# Patient Record
Sex: MALE | Race: OTHER | NOT HISPANIC OR LATINO | Employment: OTHER | URBAN - METROPOLITAN AREA
[De-identification: names, ages, dates, MRNs, and addresses within clinical notes are randomized per-mention and may not be internally consistent; named-entity substitution may affect disease eponyms.]

---

## 2024-04-15 ENCOUNTER — HOSPITAL ENCOUNTER (INPATIENT)
Facility: HOSPITAL | Age: 44
LOS: 1 days | Discharge: HOME | DRG: 065 | End: 2024-04-16
Attending: EMERGENCY MEDICINE | Admitting: STUDENT IN AN ORGANIZED HEALTH CARE EDUCATION/TRAINING PROGRAM

## 2024-04-15 DIAGNOSIS — I63.81 LACUNAR INFARCTION: ICD-10-CM

## 2024-04-15 DIAGNOSIS — I63.81 ACUTE LACUNAR INFARCTION: Primary | ICD-10-CM

## 2024-04-15 DIAGNOSIS — I63.9 STROKE: ICD-10-CM

## 2024-04-15 DIAGNOSIS — R29.818 ACUTE FOCAL NEUROLOGICAL DEFICIT: ICD-10-CM

## 2024-04-15 PROBLEM — Q21.12 PFO (PATENT FORAMEN OVALE): Status: ACTIVE | Noted: 2024-04-15

## 2024-04-15 PROBLEM — I63.332 THROMBOTIC STROKE INVOLVING LEFT POSTERIOR CEREBRAL ARTERY: Status: ACTIVE | Noted: 2024-04-15

## 2024-04-15 LAB
ALBUMIN SERPL BCP-MCNC: 3.9 G/DL (ref 3.5–5.2)
ALP SERPL-CCNC: 76 U/L (ref 55–135)
ALT SERPL W/O P-5'-P-CCNC: 22 U/L (ref 10–44)
AMPHET+METHAMPHET UR QL: NEGATIVE
ANION GAP SERPL CALC-SCNC: 11 MMOL/L (ref 8–16)
ASCENDING AORTA: 2.84 CM
AST SERPL-CCNC: 17 U/L (ref 10–40)
AV INDEX (PROSTH): 0.83
AV MEAN GRADIENT: 4 MMHG
AV PEAK GRADIENT: 8 MMHG
AV VALVE AREA BY VELOCITY RATIO: 2.67 CM²
AV VALVE AREA: 2.63 CM²
AV VELOCITY RATIO: 0.84
BARBITURATES UR QL SCN>200 NG/ML: NEGATIVE
BASOPHILS # BLD AUTO: 0.03 K/UL (ref 0–0.2)
BASOPHILS NFR BLD: 0.4 % (ref 0–1.9)
BENZODIAZ UR QL SCN>200 NG/ML: NEGATIVE
BILIRUB SERPL-MCNC: 1.8 MG/DL (ref 0.1–1)
BUN SERPL-MCNC: 14 MG/DL (ref 6–20)
BZE UR QL SCN: NEGATIVE
CALCIUM SERPL-MCNC: 9.4 MG/DL (ref 8.7–10.5)
CANNABINOIDS UR QL SCN: NEGATIVE
CHLORIDE SERPL-SCNC: 108 MMOL/L (ref 95–110)
CHOLEST SERPL-MCNC: 153 MG/DL (ref 120–199)
CHOLEST/HDLC SERPL: 2.9 {RATIO} (ref 2–5)
CO2 SERPL-SCNC: 22 MMOL/L (ref 23–29)
CREAT SERPL-MCNC: 1.1 MG/DL (ref 0.5–1.4)
CREAT UR-MCNC: 196.8 MG/DL (ref 23–375)
CRP SERPL-MCNC: 4.8 MG/L (ref 0–8.2)
CV ECHO LV RWT: 0.35 CM
DIFFERENTIAL METHOD BLD: ABNORMAL
DOP CALC AO PEAK VEL: 1.4 M/S
DOP CALC AO VTI: 32.2 CM
DOP CALC LVOT AREA: 3.2 CM2
DOP CALC LVOT DIAMETER: 2.01 CM
DOP CALC LVOT PEAK VEL: 1.18 M/S
DOP CALC LVOT STROKE VOLUME: 84.68 CM3
DOP CALC MV VTI: 27.6 CM
DOP CALCLVOT PEAK VEL VTI: 26.7 CM
E WAVE DECELERATION TIME: 232.42 MSEC
E/A RATIO: 1.51
E/E' RATIO: 5.92 M/S
ECHO LV POSTERIOR WALL: 0.98 CM (ref 0.6–1.1)
EOSINOPHIL # BLD AUTO: 0.1 K/UL (ref 0–0.5)
EOSINOPHIL NFR BLD: 1.2 % (ref 0–8)
ERYTHROCYTE [DISTWIDTH] IN BLOOD BY AUTOMATED COUNT: 17.8 % (ref 11.5–14.5)
ERYTHROCYTE [SEDIMENTATION RATE] IN BLOOD BY WESTERGREN METHOD: 1 MM/HR (ref 0–10)
EST. GFR  (NO RACE VARIABLE): >60 ML/MIN/1.73 M^2
ETHANOL SERPL-MCNC: <10 MG/DL
FERRITIN SERPL-MCNC: 90 NG/ML (ref 20–300)
FRACTIONAL SHORTENING: 34 % (ref 28–44)
GLUCOSE SERPL-MCNC: 104 MG/DL (ref 70–110)
HCT VFR BLD AUTO: 46 % (ref 40–54)
HDLC SERPL-MCNC: 53 MG/DL (ref 40–75)
HDLC SERPL: 34.6 % (ref 20–50)
HGB BLD-MCNC: 14.3 G/DL (ref 14–18)
IMM GRANULOCYTES # BLD AUTO: 0.01 K/UL (ref 0–0.04)
IMM GRANULOCYTES NFR BLD AUTO: 0.1 % (ref 0–0.5)
INTERVENTRICULAR SEPTUM: 1 CM (ref 0.6–1.1)
IRON SERPL-MCNC: 107 UG/DL (ref 45–160)
IVC DIAMETER: 1.25 CM
IVRT: 89.44 MSEC
LA MAJOR: 5.56 CM
LA MINOR: 5.61 CM
LA WIDTH: 3.9 CM
LDLC SERPL CALC-MCNC: 87.6 MG/DL (ref 63–159)
LEFT ATRIUM SIZE: 4.03 CM
LEFT ATRIUM VOLUME: 74.61 CM3
LEFT INTERNAL DIMENSION IN SYSTOLE: 3.68 CM (ref 2.1–4)
LEFT VENTRICLE DIASTOLIC VOLUME: 150.82 ML
LEFT VENTRICLE SYSTOLIC VOLUME: 57.54 ML
LEFT VENTRICULAR INTERNAL DIMENSION IN DIASTOLE: 5.55 CM (ref 3.5–6)
LEFT VENTRICULAR MASS: 213.6 G
LV LATERAL E/E' RATIO: 4.53 M/S
LV SEPTAL E/E' RATIO: 8.56 M/S
LVOT MG: 2.8 MMHG
LVOT MV: 0.77 CM/S
LYMPHOCYTES # BLD AUTO: 2.3 K/UL (ref 1–4.8)
LYMPHOCYTES NFR BLD: 31.4 % (ref 18–48)
MCH RBC QN AUTO: 21.8 PG (ref 27–31)
MCHC RBC AUTO-ENTMCNC: 31.1 G/DL (ref 32–36)
MCV RBC AUTO: 70 FL (ref 82–98)
METHADONE UR QL SCN>300 NG/ML: NEGATIVE
MONOCYTES # BLD AUTO: 0.4 K/UL (ref 0.3–1)
MONOCYTES NFR BLD: 5.8 % (ref 4–15)
MV MEAN GRADIENT: 1 MMHG
MV PEAK A VEL: 0.51 M/S
MV PEAK E VEL: 0.77 M/S
MV PEAK GRADIENT: 3 MMHG
MV STENOSIS PRESSURE HALF TIME: 67.4 MS
MV VALVE AREA BY CONTINUITY EQUATION: 3.07 CM2
MV VALVE AREA P 1/2 METHOD: 3.26 CM2
NEUTROPHILS # BLD AUTO: 4.5 K/UL (ref 1.8–7.7)
NEUTROPHILS NFR BLD: 61.1 % (ref 38–73)
NONHDLC SERPL-MCNC: 100 MG/DL
NRBC BLD-RTO: 0 /100 WBC
OHS QRS DURATION: 88 MS
OHS QTC CALCULATION: 423 MS
OPIATES UR QL SCN: NEGATIVE
PCP UR QL SCN>25 NG/ML: NEGATIVE
PISA TR MAX VEL: 2.13 M/S
PLATELET # BLD AUTO: 212 K/UL (ref 150–450)
PMV BLD AUTO: 9.6 FL (ref 9.2–12.9)
POCT GLUCOSE: 100 MG/DL (ref 70–110)
POTASSIUM SERPL-SCNC: 4.1 MMOL/L (ref 3.5–5.1)
PROT SERPL-MCNC: 7.7 G/DL (ref 6–8.4)
PULM VEIN S/D RATIO: 1.03
PV PEAK D VEL: 0.59 M/S
PV PEAK GRADIENT: 6 MMHG
PV PEAK S VEL: 0.61 M/S
PV PEAK VELOCITY: 1.2 M/S
RA MAJOR: 5.17 CM
RA PRESSURE ESTIMATED: 3 MMHG
RA WIDTH: 4 CM
RBC # BLD AUTO: 6.55 M/UL (ref 4.6–6.2)
RIGHT VENTRICULAR END-DIASTOLIC DIMENSION: 3.73 CM
RV TB RVSP: 5 MMHG
RV TISSUE DOPPLER FREE WALL SYSTOLIC VELOCITY 1 (APICAL 4 CHAMBER VIEW): 14.57 CM/S
SATURATED IRON: 29 % (ref 20–50)
SINUS: 3.3 CM
SODIUM SERPL-SCNC: 141 MMOL/L (ref 136–145)
STJ: 2.42 CM
TDI LATERAL: 0.17 M/S
TDI SEPTAL: 0.09 M/S
TDI: 0.13 M/S
TOTAL IRON BINDING CAPACITY: 363 UG/DL (ref 250–450)
TOXICOLOGY INFORMATION: NORMAL
TR MAX PG: 18 MMHG
TRANSFERRIN SERPL-MCNC: 245 MG/DL (ref 200–375)
TRIGL SERPL-MCNC: 62 MG/DL (ref 30–150)
TSH SERPL DL<=0.005 MIU/L-ACNC: 0.46 UIU/ML (ref 0.4–4)
TV REST PULMONARY ARTERY PRESSURE: 21 MMHG
WBC # BLD AUTO: 7.42 K/UL (ref 3.9–12.7)

## 2024-04-15 PROCEDURE — 85025 COMPLETE CBC W/AUTO DIFF WBC: CPT | Performed by: EMERGENCY MEDICINE

## 2024-04-15 PROCEDURE — 80307 DRUG TEST PRSMV CHEM ANLYZR: CPT | Performed by: EMERGENCY MEDICINE

## 2024-04-15 PROCEDURE — 99223 1ST HOSP IP/OBS HIGH 75: CPT | Mod: ,,, | Performed by: STUDENT IN AN ORGANIZED HEALTH CARE EDUCATION/TRAINING PROGRAM

## 2024-04-15 PROCEDURE — 93010 ELECTROCARDIOGRAM REPORT: CPT | Mod: ,,, | Performed by: INTERNAL MEDICINE

## 2024-04-15 PROCEDURE — 80061 LIPID PANEL: CPT | Performed by: EMERGENCY MEDICINE

## 2024-04-15 PROCEDURE — 85652 RBC SED RATE AUTOMATED: CPT | Performed by: STUDENT IN AN ORGANIZED HEALTH CARE EDUCATION/TRAINING PROGRAM

## 2024-04-15 PROCEDURE — 86235 NUCLEAR ANTIGEN ANTIBODY: CPT | Mod: 59 | Performed by: STUDENT IN AN ORGANIZED HEALTH CARE EDUCATION/TRAINING PROGRAM

## 2024-04-15 PROCEDURE — 80053 COMPREHEN METABOLIC PANEL: CPT | Performed by: EMERGENCY MEDICINE

## 2024-04-15 PROCEDURE — 82962 GLUCOSE BLOOD TEST: CPT

## 2024-04-15 PROCEDURE — 25000003 PHARM REV CODE 250: Performed by: STUDENT IN AN ORGANIZED HEALTH CARE EDUCATION/TRAINING PROGRAM

## 2024-04-15 PROCEDURE — 21400001 HC TELEMETRY ROOM

## 2024-04-15 PROCEDURE — 25000003 PHARM REV CODE 250: Performed by: EMERGENCY MEDICINE

## 2024-04-15 PROCEDURE — 84443 ASSAY THYROID STIM HORMONE: CPT | Performed by: EMERGENCY MEDICINE

## 2024-04-15 PROCEDURE — 82728 ASSAY OF FERRITIN: CPT | Performed by: STUDENT IN AN ORGANIZED HEALTH CARE EDUCATION/TRAINING PROGRAM

## 2024-04-15 PROCEDURE — 86140 C-REACTIVE PROTEIN: CPT | Performed by: STUDENT IN AN ORGANIZED HEALTH CARE EDUCATION/TRAINING PROGRAM

## 2024-04-15 PROCEDURE — 82077 ASSAY SPEC XCP UR&BREATH IA: CPT | Performed by: EMERGENCY MEDICINE

## 2024-04-15 PROCEDURE — 86038 ANTINUCLEAR ANTIBODIES: CPT | Performed by: STUDENT IN AN ORGANIZED HEALTH CARE EDUCATION/TRAINING PROGRAM

## 2024-04-15 PROCEDURE — 83540 ASSAY OF IRON: CPT | Performed by: STUDENT IN AN ORGANIZED HEALTH CARE EDUCATION/TRAINING PROGRAM

## 2024-04-15 PROCEDURE — 25500020 PHARM REV CODE 255: Performed by: EMERGENCY MEDICINE

## 2024-04-15 PROCEDURE — 86039 ANTINUCLEAR ANTIBODIES (ANA): CPT | Performed by: STUDENT IN AN ORGANIZED HEALTH CARE EDUCATION/TRAINING PROGRAM

## 2024-04-15 PROCEDURE — 83036 HEMOGLOBIN GLYCOSYLATED A1C: CPT | Performed by: EMERGENCY MEDICINE

## 2024-04-15 PROCEDURE — 93005 ELECTROCARDIOGRAM TRACING: CPT

## 2024-04-15 PROCEDURE — 99285 EMERGENCY DEPT VISIT HI MDM: CPT | Mod: 25

## 2024-04-15 RX ORDER — CLOPIDOGREL BISULFATE 75 MG/1
75 TABLET ORAL DAILY
Status: DISCONTINUED | OUTPATIENT
Start: 2024-04-16 | End: 2024-04-16 | Stop reason: HOSPADM

## 2024-04-15 RX ORDER — CLOPIDOGREL BISULFATE 300 MG/1
300 TABLET, FILM COATED ORAL
Status: COMPLETED | OUTPATIENT
Start: 2024-04-15 | End: 2024-04-15

## 2024-04-15 RX ORDER — ASPIRIN 325 MG
325 TABLET, DELAYED RELEASE (ENTERIC COATED) ORAL
Status: COMPLETED | OUTPATIENT
Start: 2024-04-15 | End: 2024-04-15

## 2024-04-15 RX ORDER — ATORVASTATIN CALCIUM 40 MG/1
80 TABLET, FILM COATED ORAL DAILY
Status: DISCONTINUED | OUTPATIENT
Start: 2024-04-15 | End: 2024-04-16 | Stop reason: HOSPADM

## 2024-04-15 RX ORDER — ASPIRIN 81 MG/1
81 TABLET ORAL DAILY
Status: DISCONTINUED | OUTPATIENT
Start: 2024-04-16 | End: 2024-04-16 | Stop reason: HOSPADM

## 2024-04-15 RX ADMIN — ASPIRIN 325 MG: 325 TABLET, COATED ORAL at 11:04

## 2024-04-15 RX ADMIN — ATORVASTATIN CALCIUM 80 MG: 40 TABLET, FILM COATED ORAL at 12:04

## 2024-04-15 RX ADMIN — CLOPIDOGREL BISULFATE 300 MG: 300 TABLET, FILM COATED ORAL at 11:04

## 2024-04-15 RX ADMIN — IOHEXOL 100 ML: 350 INJECTION, SOLUTION INTRAVENOUS at 08:04

## 2024-04-15 NOTE — SUBJECTIVE & OBJECTIVE
No past medical history on file.    No past surgical history on file.    Review of patient's allergies indicates:  No Known Allergies    Current Facility-Administered Medications   Medication Dose Route Frequency Provider Last Rate Last Admin    [START ON 4/16/2024] aspirin EC tablet 81 mg  81 mg Oral Daily Gordon Adam MD        atorvastatin tablet 80 mg  80 mg Oral Daily Gordon Adam MD   80 mg at 04/15/24 1252    [START ON 4/16/2024] clopidogreL tablet 75 mg  75 mg Oral Daily Gordon Adam MD         No current outpatient medications on file.     Family History    None       Tobacco Use    Smoking status: Never    Smokeless tobacco: Never   Substance and Sexual Activity    Alcohol use: Yes    Drug use: Not on file    Sexual activity: Not on file     Review of Systems   Constitutional:  Positive for activity change. Negative for fever and unexpected weight change.   HENT:  Negative for trouble swallowing and voice change.    Eyes:  Positive for visual disturbance. Negative for photophobia.   Respiratory:  Negative for cough and shortness of breath.    Cardiovascular:  Negative for chest pain, palpitations and leg swelling.   Gastrointestinal:  Negative for abdominal distention, abdominal pain, blood in stool, constipation, diarrhea, nausea and vomiting.   Endocrine: Negative for polydipsia and polyuria.   Genitourinary:  Negative for difficulty urinating, dysuria and hematuria.   Skin:  Negative for pallor and rash.   Allergic/Immunologic: Negative for immunocompromised state.   Neurological:  Positive for dizziness, light-headedness and headaches. Negative for seizures, syncope, facial asymmetry and weakness.   Psychiatric/Behavioral:  Negative for agitation, behavioral problems and confusion.      Objective:     Vital Signs (Most Recent):  Temp: 98.8 °F (37.1 °C) (04/15/24 0648)  Pulse: 64 (04/15/24 1233)  Resp: 14 (04/15/24 1233)  BP: (!) 142/84 (04/15/24 1233)  SpO2: 97 % (04/15/24 0933) Vital Signs (24h  Range):  Temp:  [98.8 °F (37.1 °C)] 98.8 °F (37.1 °C)  Pulse:  [55-86] 64  Resp:  [10-23] 14  SpO2:  [95 %-99 %] 97 %  BP: (111-149)/(72-92) 142/84     Weight: 89.8 kg (198 lb)  There is no height or weight on file to calculate BMI.     Physical Exam  Vitals and nursing note reviewed.   Constitutional:       General: He is not in acute distress.     Appearance: Normal appearance. He is well-developed. He is not diaphoretic.   HENT:      Head: Normocephalic and atraumatic.   Eyes:      General: No scleral icterus.     Pupils: Pupils are equal, round, and reactive to light.   Neck:      Thyroid: No thyromegaly.   Cardiovascular:      Rate and Rhythm: Normal rate and regular rhythm.      Heart sounds: No murmur heard.  Pulmonary:      Effort: Pulmonary effort is normal.      Breath sounds: Normal breath sounds. No stridor. No wheezing or rales.   Abdominal:      General: There is no distension.      Palpations: Abdomen is soft.      Tenderness: There is no guarding.   Musculoskeletal:         General: No swelling or deformity. Normal range of motion.      Cervical back: Normal range of motion.   Skin:     General: Skin is warm.      Capillary Refill: Capillary refill takes less than 2 seconds.      Coloration: Skin is not jaundiced.      Findings: No bruising.   Neurological:      Mental Status: He is alert and oriented to person, place, and time. Mental status is at baseline.      Cranial Nerves: No cranial nerve deficit.      Motor: No weakness.   Psychiatric:         Mood and Affect: Mood normal.         Behavior: Behavior normal.              CRANIAL NERVES     CN III, IV, VI   Pupils are equal, round, and reactive to light.           Recent Results (from the past 24 hour(s))   POCT glucose    Collection Time: 04/15/24  6:54 AM   Result Value Ref Range    POCT Glucose 100 70 - 110 mg/dL   CBC W/ AUTO DIFFERENTIAL    Collection Time: 04/15/24  7:17 AM   Result Value Ref Range    WBC 7.42 3.90 - 12.70 K/uL    RBC  6.55 (H) 4.60 - 6.20 M/uL    Hemoglobin 14.3 14.0 - 18.0 g/dL    Hematocrit 46.0 40.0 - 54.0 %    MCV 70 (L) 82 - 98 fL    MCH 21.8 (L) 27.0 - 31.0 pg    MCHC 31.1 (L) 32.0 - 36.0 g/dL    RDW 17.8 (H) 11.5 - 14.5 %    Platelets 212 150 - 450 K/uL    MPV 9.6 9.2 - 12.9 fL    Immature Granulocytes 0.1 0.0 - 0.5 %    Gran # (ANC) 4.5 1.8 - 7.7 K/uL    Immature Grans (Abs) 0.01 0.00 - 0.04 K/uL    Lymph # 2.3 1.0 - 4.8 K/uL    Mono # 0.4 0.3 - 1.0 K/uL    Eos # 0.1 0.0 - 0.5 K/uL    Baso # 0.03 0.00 - 0.20 K/uL    nRBC 0 0 /100 WBC    Gran % 61.1 38.0 - 73.0 %    Lymph % 31.4 18.0 - 48.0 %    Mono % 5.8 4.0 - 15.0 %    Eosinophil % 1.2 0.0 - 8.0 %    Basophil % 0.4 0.0 - 1.9 %    Differential Method Automated    Comprehensive metabolic panel    Collection Time: 04/15/24  7:17 AM   Result Value Ref Range    Sodium 141 136 - 145 mmol/L    Potassium 4.1 3.5 - 5.1 mmol/L    Chloride 108 95 - 110 mmol/L    CO2 22 (L) 23 - 29 mmol/L    Glucose 104 70 - 110 mg/dL    BUN 14 6 - 20 mg/dL    Creatinine 1.1 0.5 - 1.4 mg/dL    Calcium 9.4 8.7 - 10.5 mg/dL    Total Protein 7.7 6.0 - 8.4 g/dL    Albumin 3.9 3.5 - 5.2 g/dL    Total Bilirubin 1.8 (H) 0.1 - 1.0 mg/dL    Alkaline Phosphatase 76 55 - 135 U/L    AST 17 10 - 40 U/L    ALT 22 10 - 44 U/L    eGFR >60 >60 mL/min/1.73 m^2    Anion Gap 11 8 - 16 mmol/L   TSH    Collection Time: 04/15/24  7:17 AM   Result Value Ref Range    TSH 0.458 0.400 - 4.000 uIU/mL   Ethanol    Collection Time: 04/15/24  7:17 AM   Result Value Ref Range    Alcohol, Serum <10 <10 mg/dL   Drug screen panel, emergency    Collection Time: 04/15/24  8:15 AM   Result Value Ref Range    Benzodiazepines Negative Negative    Methadone metabolites Negative Negative    Cocaine (Metab.) Negative Negative    Opiate Scrn, Ur Negative Negative    Barbiturate Screen, Ur Negative Negative    Amphetamine Screen, Ur Negative Negative    THC Negative Negative    Phencyclidine Negative Negative    Creatinine, Urine 196.8 23.0 -  375.0 mg/dL    Toxicology Information SEE COMMENT    Echo    Collection Time: 04/15/24 12:39 PM   Result Value Ref Range    LVOT stroke volume 84.68 cm3    LVIDd 5.55 3.5 - 6.0 cm    LV Systolic Volume 57.54 mL    LVIDs 3.68 2.1 - 4.0 cm    LV Diastolic Volume 150.82 mL    IVS 1.00 0.6 - 1.1 cm    LVOT diameter 2.01 cm    LVOT area 3.2 cm2    FS 34 28 - 44 %    Left Ventricle Relative Wall Thickness 0.35 cm    Posterior Wall 0.98 0.6 - 1.1 cm    LV mass 213.60 g    MV Peak E Dagoberto 0.77 m/s    TDI LATERAL 0.17 m/s    TDI SEPTAL 0.09 m/s    E/E' ratio 5.92 m/s    MV Peak A Dagoberto 0.51 m/s    TR Max Dagoberto 2.13 m/s    E/A ratio 1.51     IVRT 89.44 msec    E wave deceleration time 232.42 msec    LV SEPTAL E/E' RATIO 8.56 m/s    LV LATERAL E/E' RATIO 4.53 m/s    PV Peak S Dagoberto 0.61 m/s    PV Peak D Dagoberto 0.59 m/s    Pulm vein S/D ratio 1.03     LVOT peak dagoberto 1.18 m/s    Left Ventricular Outflow Tract Mean Velocity 0.77 cm/s    Left Ventricular Outflow Tract Mean Gradient 2.80 mmHg    RVDD 3.73 cm    RV S' 14.57 cm/s    LA size 4.03 cm    Left Atrium Minor Axis 5.61 cm    Left Atrium Major Axis 5.56 cm    RA Major Axis 5.17 cm    AV mean gradient 4 mmHg    AV peak gradient 8 mmHg    Ao peak dagoberto 1.40 m/s    Ao VTI 32.20 cm    LVOT peak VTI 26.70 cm    AV valve area 2.63 cm²    AV Velocity Ratio 0.84     AV index (prosthetic) 0.83     JERSON by Velocity Ratio 2.67 cm²    MV mean gradient 1 mmHg    MV peak gradient 3 mmHg    MV stenosis pressure 1/2 time 67.40 ms    MV valve area p 1/2 method 3.26 cm2    MV valve area by continuity eq 3.07 cm2    MV VTI 27.6 cm    Triscuspid Valve Regurgitation Peak Gradient 18 mmHg    PV PEAK VELOCITY 1.20 m/s    PV peak gradient 6 mmHg    Sinus 3.30 cm    STJ 2.42 cm    Ascending aorta 2.84 cm    IVC diameter 1.25 cm    Mean e' 0.13 m/s    LA volume 74.61 cm3    LA WIDTH 3.9 cm    RA Width 4.0 cm    TV resting pulmonary artery pressure 21 mmHg    RV TB RVSP 5 mmHg    Est. RA pres 3 mmHg        Microbiology Results (last 7 days)       ** No results found for the last 168 hours. **             Imaging Results               MRI Brain Without Contrast (Final result)  Result time 04/15/24 11:03:09      Final result by Goran Samuels MD (04/15/24 11:03:09)                   Impression:      Acute left midbrain lacunar type infarct.    This report was flagged in Epic as abnormal.      Electronically signed by: Goran Samuels MD  Date:    04/15/2024  Time:    11:03               Narrative:    EXAMINATION:  MRI BRAIN WITHOUT CONTRAST    CLINICAL HISTORY:  Dizziness, persistent/recurrent, cardiac or vascular cause suspected;Headache, new or worsening, neuro deficit (Age 19-49y); diplopia;    TECHNIQUE:  Multiplanar multisequence MR imaging of the brain was performed without intravenous contrast.    COMPARISON:  CT 04/15/2024    FINDINGS:  Intracranial Compartment:    Ventricles are normal in size for age without evidence of hydrocephalus.    Subcentimeter focus of diffusion restriction in midbrain just left of midline, thought to reflect an acute lacunar type infarct.  Subtle corresponding T2-FLAIR signal hyperintensity.  No mass effect or hemorrhage. No evidence of acute infarct elsewhere.  No remote major vascular distribution infarct.    No evidence of recent or remote hemorrhage.    No significant intracranial mass effect or midline shift.    No extra-axial blood or fluid collections.    Skull/Extracranial Contents (limited evaluation):    Bone marrow signal intensity is unremarkable.    Findings were relayed to the ordering provider (Polo) via the [facility] secure chat system at approximately 11:00.                                       CTA Head and Neck (xpd) (Final result)  Result time 04/15/24 08:54:23      Final result by Maxi Gonzalez MD (04/15/24 08:54:23)                   Impression:      No acute abnormality. No high-grade stenosis or major vessel occlusion. If the patient has an acute,  focal neurological deficit, MRI of the brain may be indicated.      Electronically signed by: Maxi Gonzalez MD  Date:    04/15/2024  Time:    08:54               Narrative:    EXAMINATION:  CTA HEAD AND NECK (XPD)    CLINICAL HISTORY:  Diplopia;Neuro deficit, acute, stroke suspected;Stroke/TIA, determine embolic source;    TECHNIQUE:  Non contrast low dose axial images were obtained thought the head. CT angiogram was performed from the level of the anai to the top of the head following the IV administration of 100 mL of Omnipaque 350.   Sagittal and coronal reconstructions and maximum intensity projection reconstructions were performed. Arterial stenosis percentages are based on NASCET measurement criteria.    COMPARISON:  None.    FINDINGS:  CT HEAD:    No evidence of acute territorial infarct, hemorrhage, mass effect, or midline shift.    Ventricles are normal in size and configuration.    No displaced calvarial fracture.    Minimal mucosal thickening in the paranasal sinuses.  No air-fluid levels.  Mastoid air cells are essentially clear.    CTA HEAD AND NECK:    Soft tissues: No acute abnormality.  Few prominent cervical chain lymph nodes measuring up to 1.1 cm in short axis on the left (series 5, image 292), potentially reactive.  Right cervical chain lymph nodes measure up to 1 cm in short axis (series 5, image 285).  Epiglottis is within normal limits.  Prevertebral soft tissues are unremarkable.  Mild degenerative changes in the cervical spine.    Aorta: Two vessel aortic arch, with common origin of the brachiocephalic and left common carotid arteries.  No significant calcific atherosclerosis.    Extracranial carotid circulation: No hemodynamically significant stenosis, aneurysmal dilatation, or dissection.    Extracranial vertebral circulation: No hemodynamically significant stenosis, aneurysmal dilatation, or dissection.    Intracranial Arteries: No focal high-grade stenosis, occlusion, or  aneurysm.    Venous structures (limited evaluation): Smooth filling defect in the right transverse sinus has appearance most consistent with arachnoid granulation.  Dural venous sinuses otherwise unremarkable.

## 2024-04-15 NOTE — CONSULTS
[facility] - Emergency Dept  Neurology  Consult Note    Patient Name: Primo Krishna  MRN: [id number]  Admission Date: 4/15/2024  Hospital Length of Stay: 0 days  Code Status: No Order   Attending Provider: Gordon Adam MD   Consulting Provider: Toni Eaton MD  Primary Care Physician: No, Primary Doctor  Principal Problem:<principal problem not specified>    Inpatient consult to neurology  Consult performed by: Toni Eaton MD  Consult ordered by: Benito Cuellar MD         Subjective:     Chief Complaint:  Stroke      HPI:   43-year-old with no significant medical history with admission concerns of focal neurological deficits. And Stroke neurology called in for the same.      History from patient / family and medical records review. Symptoms of headaches / vague dizziness and binocular diplopia > 24 hrs prior to ED arrival. Reported of alcoholism this weekend / and the following morning on Sunday - have been experiencing the above mentioned symptoms. No compressive neuropathy pattern of presentation. No associated clinical seizures. No other focal motor or sensory or cranial nerve deficits. No similar events in the past. No history of strokes, seizures or migraines.     MRI brain acute left midbrain  infarct   CTA head and neck negative for VJ   Exam: binocular diplopia on downward gaze predominance left > right      No past medical history on file.    No past surgical history on file.    Review of patient's allergies indicates:  No Known Allergies    Current Neurological Medications:     Current Facility-Administered Medications   Medication Dose Route Frequency Provider Last Rate Last Admin    [START ON 4/16/2024] aspirin EC tablet 81 mg  81 mg Oral Daily Gordon Adam MD        atorvastatin tablet 80 mg  80 mg Oral Daily Gordon Adam MD   80 mg at 04/15/24 1252    [START ON 4/16/2024] clopidogreL tablet 75 mg  75 mg Oral Daily Gordon Adam MD         No current  outpatient medications on file.     Family History    None       Tobacco Use    Smoking status: Never    Smokeless tobacco: Never   Substance and Sexual Activity    Alcohol use: Yes    Drug use: Not on file    Sexual activity: Not on file     Review of Systems   Constitutional:  Negative for chills and fever.   Respiratory:  Negative for shortness of breath.    Neurological:  Positive for headaches. Negative for seizures, facial asymmetry, speech difficulty, weakness and numbness.   Psychiatric/Behavioral:  Negative for agitation and confusion.      Objective:     Vital Signs (Most Recent):  Temp: 98.8 °F (37.1 °C) (04/15/24 0648)  Pulse: 64 (04/15/24 1233)  Resp: 14 (04/15/24 1233)  BP: (!) 142/84 (04/15/24 1233)  SpO2: 97 % (04/15/24 0933) Vital Signs (24h Range):  Temp:  [98.8 °F (37.1 °C)] 98.8 °F (37.1 °C)  Pulse:  [55-86] 64  Resp:  [10-23] 14  SpO2:  [95 %-99 %] 97 %  BP: (111-149)/(72-92) 142/84     Weight: 89.8 kg (198 lb)  There is no height or weight on file to calculate BMI.     Physical Exam  HENT:      Head: Normocephalic and atraumatic.   Eyes:      Extraocular Movements: Extraocular movements intact.      Pupils: Pupils are equal, round, and reactive to light.   Pulmonary:      Effort: No respiratory distress.   Neurological:      Mental Status: He is alert and oriented to person, place, and time.   Psychiatric:         Speech: Speech normal.          NEUROLOGICAL EXAMINATION:     MENTAL STATUS   Oriented to person, place, and time.   Attention: normal.   Speech: speech is normal   Level of consciousness: alert  Normal comprehension.     CRANIAL NERVES     CN III, IV, VI   Pupils are equal, round, and reactive to light.  Nystagmus: none   Diplopia: bilateral, vertical, right and left  Ophthalmoparesis: none    CN VII   Facial expression full, symmetric.     MOTOR EXAM        No new focal motor deficits       SENSORY EXAM        No new focal sensory deficits       GAIT AND COORDINATION     Tremor  "  Resting tremor: absent      Significant Labs: Hemoglobin A1c: No results for input(s): "HGBA1C" in the last 720 hours.  Blood Culture: No results for input(s): "LABBLOO" in the last 48 hours.  BMP:   Recent Labs   Lab 04/15/24  0717         K 4.1      CO2 22*   BUN 14   CREATININE 1.1   CALCIUM 9.4     CBC:   Recent Labs   Lab 04/15/24  0717   WBC 7.42   HGB 14.3   HCT 46.0        CMP:   Recent Labs   Lab 04/15/24  0717         K 4.1      CO2 22*   BUN 14   CREATININE 1.1   CALCIUM 9.4   PROT 7.7   ALBUMIN 3.9   BILITOT 1.8*   ALKPHOS 76   AST 17   ALT 22   ANIONGAP 11     CSF Culture: No results for input(s): "CSFCULTURE" in the last 48 hours.  CSF Studies: No results for input(s): "ALIQUT", "APPEARCSF", "COLORCSF", "CSFWBC", "CSFRBC", "GLUCCSF", "LDHCSF", "PROTEINCSF", "VDRLCSF" in the last 48 hours.  Inflammatory Markers: No results for input(s): "SEDRATE", "CRP", "PROCAL" in the last 48 hours.  POCT Glucose:   Recent Labs   Lab 04/15/24  0654   POCTGLUCOSE 100     Prealbumin: No results for input(s): "PREALBUMIN" in the last 48 hours.  Respiratory Culture: No results for input(s): "GSRESP", "RESPIRATORYC" in the last 48 hours.  Urine Culture: No results for input(s): "LABURIN" in the last 48 hours.  Urine Studies: No results for input(s): "COLORU", "APPEARANCEUA", "PHUR", "SPECGRAV", "PROTEINUA", "GLUCUA", "KETONESU", "BILIRUBINUA", "OCCULTUA", "NITRITE", "UROBILINOGEN", "LEUKOCYTESUR", "RBCUA", "WBCUA", "BACTERIA", "SQUAMEPITHEL", "HYALINECASTS" in the last 48 hours.    Invalid input(s): "WRIGHTSUR"  Recent Lab Results         04/15/24  0815   04/15/24  0717   04/15/24  0654        Benzodiazepines Negative           Methadone metabolites Negative           Phencyclidine Negative           Albumin   3.9         Alcohol, Serum   <10         ALP   76         ALT   22         Amphetamines, Urine Negative           Anion Gap   11         AST   17         Barbituates, " Urine Negative           Baso #   0.03         Basophil %   0.4         BILIRUBIN TOTAL   1.8  Comment: For infants and newborns, interpretation of results should be based  on gestational age, weight and in agreement with clinical  observations.    Premature Infant recommended reference ranges:  Up to 24 hours.............<8.0 mg/dL  Up to 48 hours............<12.0 mg/dL  3-5 days..................<15.0 mg/dL  6-29 days.................<15.0 mg/dL           BUN   14         Calcium   9.4         Chloride   108         CO2   22         Cocaine, Urine Negative           Creatinine   1.1         Urine Creatinine 196.8           Differential Method   Automated         eGFR   >60         Eos #   0.1         Eos %   1.2         Glucose   104         Gran # (ANC)   4.5         Gran %   61.1         Hematocrit   46.0         Hemoglobin   14.3         Immature Grans (Abs)   0.01  Comment: Mild elevation in immature granulocytes is non specific and   can be seen in a variety of conditions including stress response,   acute inflammation, trauma and pregnancy. Correlation with other   laboratory and clinical findings is essential.           Immature Granulocytes   0.1         Lymph #   2.3         Lymph %   31.4         MCH   21.8         MCHC   31.1         MCV   70         Mono #   0.4         Mono %   5.8         MPV   9.6         nRBC   0         Opiates, Urine Negative           Platelet Count   212         POCT Glucose     100       Potassium   4.1         PROTEIN TOTAL   7.7         RBC   6.55         RDW   17.8         Sodium   141         Marijuana (THC) Metabolite Negative           Toxicology Information SEE COMMENT  Comment: This screen includes the following classes of drugs at the listed   cut-off:    Benzodiazepines 200 ng/ml  Methadone 300 ng/ml  Cocaine metabolite 300 ng/ml  Opiates 300 ng/ml  Barbiturates 200 ng/ml  Amphetamines 1000 ng/ml  Marijuana metabs (THC) 50 ng/ml  Phencyclidine (PCP) 25 ng/ml    This  is a screening test. If results do not correlate with clinical   presentation, then a confirmatory send out test is advised.     This report is intended for use in clinical monitoring and management   of   patients. It is not intended for use in employment related drug   testing.             TSH   0.458         WBC   7.42               All pertinent lab results from the past 24 hours have been reviewed.    Significant Imaging: I have reviewed and interpreted all pertinent imaging results/findings within the past 24 hours.  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 And Stroke neurology called in for the same.      History from patient / family and medical records review. Symptoms of headaches / vague dizziness and binocular diplopia > 24 hrs prior to ED arrival. Reported of alcoholism this weekend / and the following morning on Sunday - have been experiencing the above mentioned symptoms. No compressive neuropathy pattern of presentation. No associated clinical seizures. No other focal motor or sensory or cranial nerve deficits. No similar events in the past. No history of strokes, seizures or migraines.     MRI brain acute left midbrain  infarct   CTA head and neck negative for VJ   Exam: binocular diplopia on downward gaze predominance left > right     Recs:  Thrombotic stroke involving the left PCA / midbrain infarct. No TNK.      -  / Plavix 300 mg load - followed by DAPT X 21 day, ASA 81/Plavix 75, with asa thereafter  - target LDL < 70 / Continue atorvastatin  - euglycemic goals   - permissive HTN x 72 hrs post index event / long term < 140/90  - Echo f/u   - PT/OT recs - discharge planning   - F/u PCP for risk factor modification monitoring  -  on DASH diet; exercise and lifestyle modifications when appropriate     Stroke counseling provided  during the visit - Identification of signs; emergency action and ER attention; Risk factor control, optimization for secondary stroke prevention; Compliance to medications       Headaches - IV Mag So4 2g IV q12hrs prn +/- prn tylenol   Avoid triptans   Hypercoagulable work up prn on op basis     F/u vascular neurology            VTE Risk Mitigation (From admission, onward)      None            Thank you for your consult.     Toni Eaton MD  Neurology  [facility] - [place] - Emergency Dept

## 2024-04-15 NOTE — ASSESSMENT & PLAN NOTE
43-year-old with no significant medical history with admission concerns of focal neurological deficits. And Stroke neurology called in for the same.      History from patient / family and medical records review. Symptoms of headaches / vague dizziness and binocular diplopia > 24 hrs prior to ED arrival. Reported of alcoholism this weekend / and the following morning on Sunday - have been experiencing the above mentioned symptoms. No compressive neuropathy pattern of presentation. No associated clinical seizures. No other focal motor or sensory or cranial nerve deficits. No similar events in the past. No history of strokes, seizures or migraines.     MRI brain acute left midbrain  infarct   CTA head and neck negative for VJ   Exam: binocular diplopia on downward gaze predominance left > right     Recs:  Thrombotic stroke involving the left PCA / midbrain infarct. No TNK.      -  / Plavix 300 mg load - followed by DAPT X 21 day, ASA 81/Plavix 75, with asa thereafter  - target LDL < 70 / Continue atorvastatin  - euglycemic goals   - permissive HTN x 72 hrs post index event / long term < 140/90  - Echo f/u   - PT/OT recs - discharge planning   - F/u PCP for risk factor modification monitoring  -  on DASH diet; exercise and lifestyle modifications when appropriate     Stroke counseling provided during the visit - Identification of signs; emergency action and ER attention; Risk factor control, optimization for secondary stroke prevention; Compliance to medications       Headaches - IV Mag So4 2g IV q12hrs prn +/- prn tylenol   Avoid triptans   Hypercoagulable work up prn on op basis     F/u vascular neurology

## 2024-04-15 NOTE — ASSESSMENT & PLAN NOTE
Imaging:   MRI obtained  CTA Head/Neck to assess vasculature w/o major path  ECHO with bubble to assess for apparent clots and paradoxical strokes  Does have a small ASD/PFO      Labs:  Lipid panel, TSH, and A1c ordered    Consults:   Occupational Therapy  Physical Therapy   Speech Therapy  Vascular Neurology     Antithrombotics and Statin:   Aspirin: 325 mg oral once, 81 mg thereafter  Plavix 300 mg oral once, then 75 mg thereafter   SC enox 40mg or Hep 5k for dvt ppx  Atorvastatin 80 mg oral daily     Nursing Orders:   B/P: Permissive Hypertension if ischemic stroke expected  BP < 220/120 if no tPA administered or   BP < 180/105 if tPA administered   Neuro checks- q 4 hours  Antiembolic stockings  Swallowing evaluation by Nursing  Stroke Education  Blood glucose target 100-130  Up with assistance as tolerated

## 2024-04-15 NOTE — PT/OT/SLP PROGRESS
Speech Language Pathology      Primo Krishna  MRN: [id number]    Patient not seen today secondary to unavailable with EKG/echo, notably passed Jeffrey dysphagia screen, does not need to remain npo for SLP clinical swallow, will re-attempt either later today time permitting or tomorrow in the am. Cynthia Milan, MEHUL-SLP

## 2024-04-15 NOTE — LETTER
April 16, 2024         Nelly MATOS  [facility]  [place] [address]  Phone: [phone]  Fax: [phone]       Patient: Primo Krishna  YOB: 1980  Date of Visit: 04/16/2024    To Whom It May Concern:    Primo Krishna was at [facility] on 4/15/24 and transferred to [facility]. He may return to work on April/30/2024. If you have any questions or concerns, or if I can be of further assistance, please do not hesitate to contact my office.    Sincerely,        Sonia Boyd RN

## 2024-04-15 NOTE — SUBJECTIVE & OBJECTIVE
No past medical history on file.    No past surgical history on file.    Review of patient's allergies indicates:  No Known Allergies    Current Neurological Medications:     Current Facility-Administered Medications   Medication Dose Route Frequency Provider Last Rate Last Admin    [START ON 4/16/2024] aspirin EC tablet 81 mg  81 mg Oral Daily Gordon Adam MD        atorvastatin tablet 80 mg  80 mg Oral Daily Gordon Adam MD   80 mg at 04/15/24 1252    [START ON 4/16/2024] clopidogreL tablet 75 mg  75 mg Oral Daily Gordon Adam MD         No current outpatient medications on file.     Family History    None       Tobacco Use    Smoking status: Never    Smokeless tobacco: Never   Substance and Sexual Activity    Alcohol use: Yes    Drug use: Not on file    Sexual activity: Not on file     Review of Systems   Constitutional:  Negative for chills and fever.   Respiratory:  Negative for shortness of breath.    Neurological:  Positive for headaches. Negative for seizures, facial asymmetry, speech difficulty, weakness and numbness.   Psychiatric/Behavioral:  Negative for agitation and confusion.      Objective:     Vital Signs (Most Recent):  Temp: 98.8 °F (37.1 °C) (04/15/24 0648)  Pulse: 64 (04/15/24 1233)  Resp: 14 (04/15/24 1233)  BP: (!) 142/84 (04/15/24 1233)  SpO2: 97 % (04/15/24 0933) Vital Signs (24h Range):  Temp:  [98.8 °F (37.1 °C)] 98.8 °F (37.1 °C)  Pulse:  [55-86] 64  Resp:  [10-23] 14  SpO2:  [95 %-99 %] 97 %  BP: (111-149)/(72-92) 142/84     Weight: 89.8 kg (198 lb)  There is no height or weight on file to calculate BMI.     Physical Exam  HENT:      Head: Normocephalic and atraumatic.   Eyes:      Extraocular Movements: Extraocular movements intact.      Pupils: Pupils are equal, round, and reactive to light.   Pulmonary:      Effort: No respiratory distress.   Neurological:      Mental Status: He is alert and oriented to person, place, and time.   Psychiatric:         Speech: Speech normal.         "  NEUROLOGICAL EXAMINATION:     MENTAL STATUS   Oriented to person, place, and time.   Attention: normal.   Speech: speech is normal   Level of consciousness: alert  Normal comprehension.     CRANIAL NERVES     CN III, IV, VI   Pupils are equal, round, and reactive to light.  Nystagmus: none   Diplopia: bilateral, vertical, right and left  Ophthalmoparesis: none    CN VII   Facial expression full, symmetric.     MOTOR EXAM        No new focal motor deficits       SENSORY EXAM        No new focal sensory deficits       GAIT AND COORDINATION     Tremor   Resting tremor: absent      Significant Labs: Hemoglobin A1c: No results for input(s): "HGBA1C" in the last 720 hours.  Blood Culture: No results for input(s): "LABBLOO" in the last 48 hours.  BMP:   Recent Labs   Lab 04/15/24  0717         K 4.1      CO2 22*   BUN 14   CREATININE 1.1   CALCIUM 9.4     CBC:   Recent Labs   Lab 04/15/24  0717   WBC 7.42   HGB 14.3   HCT 46.0        CMP:   Recent Labs   Lab 04/15/24  0717         K 4.1      CO2 22*   BUN 14   CREATININE 1.1   CALCIUM 9.4   PROT 7.7   ALBUMIN 3.9   BILITOT 1.8*   ALKPHOS 76   AST 17   ALT 22   ANIONGAP 11     CSF Culture: No results for input(s): "CSFCULTURE" in the last 48 hours.  CSF Studies: No results for input(s): "ALIQUT", "APPEARCSF", "COLORCSF", "CSFWBC", "CSFRBC", "GLUCCSF", "LDHCSF", "PROTEINCSF", "VDRLCSF" in the last 48 hours.  Inflammatory Markers: No results for input(s): "SEDRATE", "CRP", "PROCAL" in the last 48 hours.  POCT Glucose:   Recent Labs   Lab 04/15/24  0654   POCTGLUCOSE 100     Prealbumin: No results for input(s): "PREALBUMIN" in the last 48 hours.  Respiratory Culture: No results for input(s): "GSRESP", "RESPIRATORYC" in the last 48 hours.  Urine Culture: No results for input(s): "LABURIN" in the last 48 hours.  Urine Studies: No results for input(s): "COLORU", "APPEARANCEUA", "PHUR", "SPECGRAV", "PROTEINUA", "GLUCUA", "KETONESU", " ""BILIRUBINUA", "OCCULTUA", "NITRITE", "UROBILINOGEN", "LEUKOCYTESUR", "RBCUA", "WBCUA", "BACTERIA", "SQUAMEPITHEL", "HYALINECASTS" in the last 48 hours.    Invalid input(s): "WRIGHTSUR"  Recent Lab Results         04/15/24  0815   04/15/24  0717   04/15/24  0654        Benzodiazepines Negative           Methadone metabolites Negative           Phencyclidine Negative           Albumin   3.9         Alcohol, Serum   <10         ALP   76         ALT   22         Amphetamines, Urine Negative           Anion Gap   11         AST   17         Barbituates, Urine Negative           Baso #   0.03         Basophil %   0.4         BILIRUBIN TOTAL   1.8  Comment: For infants and newborns, interpretation of results should be based  on gestational age, weight and in agreement with clinical  observations.    Premature Infant recommended reference ranges:  Up to 24 hours.............<8.0 mg/dL  Up to 48 hours............<12.0 mg/dL  3-5 days..................<15.0 mg/dL  6-29 days.................<15.0 mg/dL           BUN   14         Calcium   9.4         Chloride   108         CO2   22         Cocaine, Urine Negative           Creatinine   1.1         Urine Creatinine 196.8           Differential Method   Automated         eGFR   >60         Eos #   0.1         Eos %   1.2         Glucose   104         Gran # (ANC)   4.5         Gran %   61.1         Hematocrit   46.0         Hemoglobin   14.3         Immature Grans (Abs)   0.01  Comment: Mild elevation in immature granulocytes is non specific and   can be seen in a variety of conditions including stress response,   acute inflammation, trauma and pregnancy. Correlation with other   laboratory and clinical findings is essential.           Immature Granulocytes   0.1         Lymph #   2.3         Lymph %   31.4         MCH   21.8         MCHC   31.1         MCV   70         Mono #   0.4         Mono %   5.8         MPV   9.6         nRBC   0         Opiates, Urine Negative        "    Platelet Count   212         POCT Glucose     100       Potassium   4.1         PROTEIN TOTAL   7.7         RBC   6.55         RDW   17.8         Sodium   141         Marijuana (THC) Metabolite Negative           Toxicology Information SEE COMMENT  Comment: This screen includes the following classes of drugs at the listed   cut-off:    Benzodiazepines 200 ng/ml  Methadone 300 ng/ml  Cocaine metabolite 300 ng/ml  Opiates 300 ng/ml  Barbiturates 200 ng/ml  Amphetamines 1000 ng/ml  Marijuana metabs (THC) 50 ng/ml  Phencyclidine (PCP) 25 ng/ml    This is a screening test. If results do not correlate with clinical   presentation, then a confirmatory send out test is advised.     This report is intended for use in clinical monitoring and management   of   patients. It is not intended for use in employment related drug   testing.             TSH   0.458         WBC   7.42               All pertinent lab results from the past 24 hours have been reviewed.    Significant Imaging: I have reviewed and interpreted all pertinent imaging results/findings within the past 24 hours.

## 2024-04-15 NOTE — HPI
43-year-old with no significant medical history with admission concerns of focal neurological deficits. And Stroke neurology called in for the same.      History from patient / family and medical records review. Symptoms of headaches / vague dizziness and binocular diplopia > 24 hrs prior to ED arrival. Reported of alcoholism this weekend / and the following morning on Sunday - have been experiencing the above mentioned symptoms. No compressive neuropathy pattern of presentation. No associated clinical seizures. No other focal motor or sensory or cranial nerve deficits. No similar events in the past. No history of strokes, seizures or migraines.     MRI brain acute left midbrain  infarct   CTA head and neck negative for VJ   Exam: binocular diplopia on downward gaze predominance left > right

## 2024-04-15 NOTE — ED PROVIDER NOTES
Encounter Date: 4/15/2024       History     Chief Complaint   Patient presents with    Dizziness    Blurred Vision     Pt c/o dizziness and blurred vision. Pt went out Saturday night and had some drinks, slept all day yesterday. Onset yesterday 20:00 hrs. Pt denies CP, SOB, ABD pain, weakness, H/A and any other complaints. Pt HODGE without weakness. Pt is VAN negative. +A/O x 4 w/ ABCs intact, NAD. VSS.      44 yo male otherwise healthy presenting with dizziness and blurred vision. Pt went out Saturday night and had some drinks, slept all day yesterday. Onset yesterday 20:00 hrs.  Largest complaint is diplopia.  He reports normal vision out of each eye individually with together notes blurred vision.  Also notes mild dizziness.  Pt denies CP, SOB, ABD pain, weakness, H/A and any other complaints.       Review of patient's allergies indicates:  No Known Allergies  No past medical history on file.  No past surgical history on file.  No family history on file.  Social History     Tobacco Use    Smoking status: Never    Smokeless tobacco: Never   Substance Use Topics    Alcohol use: Yes     Review of Systems   Constitutional:  Negative for fever.   HENT:  Negative for sore throat.    Eyes:  Positive for visual disturbance. Negative for photophobia.   Respiratory:  Negative for shortness of breath.    Cardiovascular:  Negative for chest pain.   Gastrointestinal:  Negative for nausea.   Genitourinary:  Negative for dysuria.   Musculoskeletal:  Negative for back pain.   Skin:  Negative for rash.   Neurological:  Positive for dizziness. Negative for syncope, facial asymmetry, speech difficulty, weakness, numbness and headaches.   Hematological:  Does not bruise/bleed easily.       Physical Exam     Initial Vitals [04/15/24 0648]   BP Pulse Resp Temp SpO2   (!) 149/76 86 18 98.8 °F (37.1 °C) 98 %      MAP       --         Physical Exam    Nursing note and vitals reviewed.  Constitutional: He appears well-developed and  well-nourished. He is not diaphoretic. No distress.   HENT:   Head: Normocephalic and atraumatic.   Eyes: Conjunctivae and EOM are normal. Pupils are equal, round, and reactive to light. No scleral icterus.   Neck: Neck supple.   Normal range of motion.  Cardiovascular:  Normal rate, regular rhythm, normal heart sounds and intact distal pulses.     Exam reveals no gallop and no friction rub.       No murmur heard.  Pulmonary/Chest: Breath sounds normal. No stridor. No respiratory distress. He has no wheezes. He has no rhonchi. He has no rales.   Abdominal: Abdomen is soft. Bowel sounds are normal. He exhibits no distension. There is no abdominal tenderness. There is no rebound and no guarding.   Musculoskeletal:         General: No tenderness or edema. Normal range of motion.      Cervical back: Normal range of motion and neck supple.     Neurological: He is alert and oriented to person, place, and time. He has normal strength. No cranial nerve deficit. GCS score is 15. GCS eye subscore is 4. GCS verbal subscore is 5. GCS motor subscore is 6.   Skin: Skin is warm and dry. No rash noted.   Psychiatric: He has a normal mood and affect. His behavior is normal.         ED Course   Procedures  Labs Reviewed   CBC W/ AUTO DIFFERENTIAL - Abnormal; Notable for the following components:       Result Value    RBC 6.55 (*)     MCV 70 (*)     MCH 21.8 (*)     MCHC 31.1 (*)     RDW 17.8 (*)     All other components within normal limits   COMPREHENSIVE METABOLIC PANEL - Abnormal; Notable for the following components:    CO2 22 (*)     Total Bilirubin 1.8 (*)     All other components within normal limits   TSH   ALCOHOL,MEDICAL (ETHANOL)   DRUG SCREEN PANEL, URINE EMERGENCY    Narrative:     Specimen Source->Urine   HEMOGLOBIN A1C   LIPID PANEL   FERRITIN   IRON AND TIBC   C-REACTIVE PROTEIN   SEDIMENTATION RATE   LIPID PANEL   BRENDA PROFILE I (SCREEN) W/ REFLEX   HEMOGLOBIN A1C   POCT GLUCOSE, HAND-HELD DEVICE   POCT GLUCOSE   POCT  GLUCOSE MONITORING CONTINUOUS     EKG Readings: (Independently Interpreted)   Initial Reading: No STEMI. Rhythm: Normal Sinus Rhythm. Heart Rate: 59. Ectopy: No Ectopy.   No ST segment elevation or depression concerning for acute ischemia.          Imaging Results               MRI Brain Without Contrast (Final result)  Result time 04/15/24 11:03:09      Final result by Goran Samuels MD (04/15/24 11:03:09)                   Impression:      Acute left midbrain lacunar type infarct.    This report was flagged in Epic as abnormal.      Electronically signed by: Goran Samuels MD  Date:    04/15/2024  Time:    11:03               Narrative:    EXAMINATION:  MRI BRAIN WITHOUT CONTRAST    CLINICAL HISTORY:  Dizziness, persistent/recurrent, cardiac or vascular cause suspected;Headache, new or worsening, neuro deficit (Age 19-49y); diplopia;    TECHNIQUE:  Multiplanar multisequence MR imaging of the brain was performed without intravenous contrast.    COMPARISON:  CT 04/15/2024    FINDINGS:  Intracranial Compartment:    Ventricles are normal in size for age without evidence of hydrocephalus.    Subcentimeter focus of diffusion restriction in midbrain just left of midline, thought to reflect an acute lacunar type infarct.  Subtle corresponding T2-FLAIR signal hyperintensity.  No mass effect or hemorrhage. No evidence of acute infarct elsewhere.  No remote major vascular distribution infarct.    No evidence of recent or remote hemorrhage.    No significant intracranial mass effect or midline shift.    No extra-axial blood or fluid collections.    Skull/Extracranial Contents (limited evaluation):    Bone marrow signal intensity is unremarkable.    Findings were relayed to the ordering provider (Polo) via the [facility] secure chat system at approximately 11:00.                                       CTA Head and Neck (xpd) (Final result)  Result time 04/15/24 08:54:23      Final result by Maxi Gonzalez MD (04/15/24  08:54:23)                   Impression:      No acute abnormality. No high-grade stenosis or major vessel occlusion. If the patient has an acute, focal neurological deficit, MRI of the brain may be indicated.      Electronically signed by: Maxi Gonzalez MD  Date:    04/15/2024  Time:    08:54               Narrative:    EXAMINATION:  CTA HEAD AND NECK (XPD)    CLINICAL HISTORY:  Diplopia;Neuro deficit, acute, stroke suspected;Stroke/TIA, determine embolic source;    TECHNIQUE:  Non contrast low dose axial images were obtained thought the head. CT angiogram was performed from the level of the anai to the top of the head following the IV administration of 100 mL of Omnipaque 350.   Sagittal and coronal reconstructions and maximum intensity projection reconstructions were performed. Arterial stenosis percentages are based on NASCET measurement criteria.    COMPARISON:  None.    FINDINGS:  CT HEAD:    No evidence of acute territorial infarct, hemorrhage, mass effect, or midline shift.    Ventricles are normal in size and configuration.    No displaced calvarial fracture.    Minimal mucosal thickening in the paranasal sinuses.  No air-fluid levels.  Mastoid air cells are essentially clear.    CTA HEAD AND NECK:    Soft tissues: No acute abnormality.  Few prominent cervical chain lymph nodes measuring up to 1.1 cm in short axis on the left (series 5, image 292), potentially reactive.  Right cervical chain lymph nodes measure up to 1 cm in short axis (series 5, image 285).  Epiglottis is within normal limits.  Prevertebral soft tissues are unremarkable.  Mild degenerative changes in the cervical spine.    Aorta: Two vessel aortic arch, with common origin of the brachiocephalic and left common carotid arteries.  No significant calcific atherosclerosis.    Extracranial carotid circulation: No hemodynamically significant stenosis, aneurysmal dilatation, or dissection.    Extracranial vertebral circulation: No  hemodynamically significant stenosis, aneurysmal dilatation, or dissection.    Intracranial Arteries: No focal high-grade stenosis, occlusion, or aneurysm.    Venous structures (limited evaluation): Smooth filling defect in the right transverse sinus has appearance most consistent with arachnoid granulation.  Dural venous sinuses otherwise unremarkable.                                       Medications   atorvastatin tablet 80 mg (80 mg Oral Given 4/15/24 1252)   clopidogreL tablet 75 mg (has no administration in time range)   aspirin EC tablet 81 mg (has no administration in time range)   iohexoL (OMNIPAQUE 350) injection 100 mL (100 mLs Intravenous Given 4/15/24 0816)   aspirin EC tablet 325 mg (325 mg Oral Given 4/15/24 1122)   clopidogreL tablet 300 mg (300 mg Oral Given 4/15/24 1122)     Medical Decision Making  Amount and/or Complexity of Data Reviewed  Labs: ordered.  Radiology: ordered.    Risk  OTC drugs.  Prescription drug management.  Decision regarding hospitalization.                          Medical Decision Making:   Initial Assessment:   43-year-old male presenting with diploplia and dizziness.  On exam patient is in no acute distress.  Equal ocular movements are intact.  He has no nystagmus.  He has no ataxia.  He is normal strength with otherwise normal cranial nerves 2-12.  Pupils are equal round reactive.  Vitals normal.  Stroke seems less likely given normal neuro workup, however diplopia could be due to other causes such as space-occupying lesion.  Stroke possible though perhaps less likely.  Will get CT, CT head, labs.  Will get neuro consult and reassess.  Differential Diagnosis:   Stroke, migraine, ocular muscle weakness  ED Management:  CTA is negative for large vessel occlusion.  Neuro exam is normal.  MRI reveals a lacunar infarct, positive for stroke.  Neurology has seen the patient.  They are recommending aspirin and Plavix load.  Further workup for stroke as well as occupational  therapy consult, alternating eye patch, etc.             Clinical Impression:  Final diagnoses:  [R29.818] Acute focal neurological deficit  [I63.81] Acute lacunar infarction (Primary)  [I63.81] Lacunar infarction          ED Disposition Condition    Admit                 Benito Cuellar MD  04/15/24 1600

## 2024-04-15 NOTE — ADMISSIONCARE
AdmissionCare    Guideline: Stroke (Ischemic) - INPT, Inpatient    Based on the indications selected for the patient, the bed status of Inpatient was determined to be MET    The following indications were selected as present at the time of evaluation of the patient:      - Acute ischemic stroke with neurologic findings that warrant inpatient care, as indicated by 1 or more of the following:    - Gait impairment    AdmissionCare documentation entered by: Kervin Boles    [facility], 27th edition, Copyright © 2023 [facility], [facility] All Rights Reserved.  [id number]:00

## 2024-04-15 NOTE — ED NOTES
Primo Krishna, a 43 y.o. male presents to the ED w/ complaint of dizziness onset yesterday with associated blurred vision, Patient reports ETOH intake x3 days ago and reports now having fatigue and unstable gait. Pt with no PMHx. Denies CP, SOB, or nausea. Patient AAOX4 GCS 15. In NAD.     Triage note:  Chief Complaint   Patient presents with    Dizziness    Blurred Vision     Pt c/o dizziness and blurred vision. Pt went out Saturday night and had some drinks, slept all day yesterday. Onset yesterday 20:00 hrs. Pt denies CP, SOB, ABD pain, weakness, H/A and any other complaints. Pt HODGE without weakness. Pt is VAN negative. +A/O x 4 w/ ABCs intact, NAD. VSS.      Review of patient's allergies indicates:  No Known Allergies  No past medical history on file.

## 2024-04-15 NOTE — PHARMACY MED REC
"Admission Medication History     The home medication history was taken by Mirta Borges CPhT.    You may go to "Admission" then "Reconcile Home Medications" tabs to review and/or act upon these items.     The home medication list has been updated by the Pharmacy department.   Please read ALL comments highlighted in yellow.   Please address this information as you see fit.    Feel free to contact us if you have any questions or require assistance.        Medications listed below were obtained from: Patient/family and Analytic software- [employer]  Current Facility-Administered Medications   Medication Dose Route Frequency Provider Last Rate Last Admin    [START ON 4/16/2024] aspirin EC tablet 81 mg  81 mg Oral Daily Gordon Adam MD        atorvastatin tablet 80 mg  80 mg Oral Daily Gordon Adam MD   80 mg at 04/15/24 1252    [START ON 4/16/2024] clopidogreL tablet 75 mg  75 mg Oral Daily Gordon Adam MD         No current outpatient medications on file.       Patient not taking any medications prescribed by a doctor at this time.    Patient is not taking any OTCs at this time.    This information was collected by  #[id number] Mirta Millan CPhT.  [phone]                  .        "

## 2024-04-15 NOTE — H&P
[facility] Emergency [facility] Medicine  History & Physical    Patient Name: Primo Krishna  MRN: [id number]  Patient Class: IP- Inpatient  Admission Date: 4/15/2024  Attending Physician: Gordon Adam MD   Primary Care Provider: Ch[facility] Primary Doctor         Patient information was obtained from patient, past medical records, and ER records.     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 Pt went out Saturday night and had some drinks, slept all day yesterday. Onset yesterday 20:00 hrs. Pt denies CP, SOB, ABD pain, weakness, H/A and any other complaints. Pt HODGE without weakness. Pt is VAN negative. +A/O x 4 w/ ABCs intact, NAD. VSS.         HPI:   Primo Krishna is a 43 y.o. male who has no hx past medical history, presented to the ED with CC of diplopia times 12 hours. Complains of blurred vision and dizziness as well. CTA showed no high-grade stenosis or major vessel occlusion. MRI positive for stroke, acute left midbrain lacunar type infarct. Patient states that he has normal vision out of each eye individually with together notes blurred vision. Started at 8pm last night. Neurology has seen the patient. Patient given aspirin, statin, and plavix load. ECHO w bubble pending.     No past medical history on file.    No past surgical history on file.    Review of patient's allergies indicates:  No Known Allergies    Current Facility-Administered Medications   Medication Dose Route Frequency Provider Last Rate Last Admin    [START ON 4/16/2024] aspirin EC tablet 81 mg  81 mg Oral Daily Gordon Adam MD        atorvastatin tablet 80 mg  80 mg Oral Daily Gordon Adam MD   80 mg at 04/15/24 1252    [START ON 4/16/2024] clopidogreL tablet 75 mg  75 mg Oral Daily Gordon Adam MD         No current outpatient medications  on file.     Family History    None       Tobacco Use    Smoking status: Never    Smokeless tobacco: Never   Substance and Sexual Activity    Alcohol use: Yes    Drug use: Not on file    Sexual activity: Not on file     Review of Systems   Constitutional:  Positive for activity change. Negative for fever and unexpected weight change.   HENT:  Negative for trouble swallowing and voice change.    Eyes:  Positive for visual disturbance. Negative for photophobia.   Respiratory:  Negative for cough and shortness of breath.    Cardiovascular:  Negative for chest pain, palpitations and leg swelling.   Gastrointestinal:  Negative for abdominal distention, abdominal pain, blood in stool, constipation, diarrhea, nausea and vomiting.   Endocrine: Negative for polydipsia and polyuria.   Genitourinary:  Negative for difficulty urinating, dysuria and hematuria.   Skin:  Negative for pallor and rash.   Allergic/Immunologic: Negative for immunocompromised state.   Neurological:  Positive for dizziness, light-headedness and headaches. Negative for seizures, syncope, facial asymmetry and weakness.   Psychiatric/Behavioral:  Negative for agitation, behavioral problems and confusion.      Objective:     Vital Signs (Most Recent):  Temp: 98.8 °F (37.1 °C) (04/15/24 0648)  Pulse: 64 (04/15/24 1233)  Resp: 14 (04/15/24 1233)  BP: (!) 142/84 (04/15/24 1233)  SpO2: 97 % (04/15/24 0933) Vital Signs (24h Range):  Temp:  [98.8 °F (37.1 °C)] 98.8 °F (37.1 °C)  Pulse:  [55-86] 64  Resp:  [10-23] 14  SpO2:  [95 %-99 %] 97 %  BP: (111-149)/(72-92) 142/84     Weight: 89.8 kg (198 lb)  There is no height or weight on file to calculate BMI.     Physical Exam  Vitals and nursing note reviewed.   Constitutional:       General: He is not in acute distress.     Appearance: Normal appearance. He is well-developed. He is not diaphoretic.   HENT:      Head: Normocephalic and atraumatic.   Eyes:      General: No scleral icterus.     Pupils: Pupils are equal,  round, and reactive to light.   Neck:      Thyroid: No thyromegaly.   Cardiovascular:      Rate and Rhythm: Normal rate and regular rhythm.      Heart sounds: No murmur heard.  Pulmonary:      Effort: Pulmonary effort is normal.      Breath sounds: Normal breath sounds. No stridor. No wheezing or rales.   Abdominal:      General: There is no distension.      Palpations: Abdomen is soft.      Tenderness: There is no guarding.   Musculoskeletal:         General: No swelling or deformity. Normal range of motion.      Cervical back: Normal range of motion.   Skin:     General: Skin is warm.      Capillary Refill: Capillary refill takes less than 2 seconds.      Coloration: Skin is not jaundiced.      Findings: No bruising.   Neurological:      Mental Status: He is alert and oriented to person, place, and time. Mental status is at baseline.      Cranial Nerves: No cranial nerve deficit.      Motor: No weakness.   Psychiatric:         Mood and Affect: Mood normal.         Behavior: Behavior normal.              CRANIAL NERVES     CN III, IV, VI   Pupils are equal, round, and reactive to light.           Recent Results (from the past 24 hour(s))   POCT glucose    Collection Time: 04/15/24  6:54 AM   Result Value Ref Range    POCT Glucose 100 70 - 110 mg/dL   CBC W/ AUTO DIFFERENTIAL    Collection Time: 04/15/24  7:17 AM   Result Value Ref Range    WBC 7.42 3.90 - 12.70 K/uL    RBC 6.55 (H) 4.60 - 6.20 M/uL    Hemoglobin 14.3 14.0 - 18.0 g/dL    Hematocrit 46.0 40.0 - 54.0 %    MCV 70 (L) 82 - 98 fL    MCH 21.8 (L) 27.0 - 31.0 pg    MCHC 31.1 (L) 32.0 - 36.0 g/dL    RDW 17.8 (H) 11.5 - 14.5 %    Platelets 212 150 - 450 K/uL    MPV 9.6 9.2 - 12.9 fL    Immature Granulocytes 0.1 0.0 - 0.5 %    Gran # (ANC) 4.5 1.8 - 7.7 K/uL    Immature Grans (Abs) 0.01 0.00 - 0.04 K/uL    Lymph # 2.3 1.0 - 4.8 K/uL    Mono # 0.4 0.3 - 1.0 K/uL    Eos # 0.1 0.0 - 0.5 K/uL    Baso # 0.03 0.00 - 0.20 K/uL    nRBC 0 0 /100 WBC    Gran % 61.1  38.0 - 73.0 %    Lymph % 31.4 18.0 - 48.0 %    Mono % 5.8 4.0 - 15.0 %    Eosinophil % 1.2 0.0 - 8.0 %    Basophil % 0.4 0.0 - 1.9 %    Differential Method Automated    Comprehensive metabolic panel    Collection Time: 04/15/24  7:17 AM   Result Value Ref Range    Sodium 141 136 - 145 mmol/L    Potassium 4.1 3.5 - 5.1 mmol/L    Chloride 108 95 - 110 mmol/L    CO2 22 (L) 23 - 29 mmol/L    Glucose 104 70 - 110 mg/dL    BUN 14 6 - 20 mg/dL    Creatinine 1.1 0.5 - 1.4 mg/dL    Calcium 9.4 8.7 - 10.5 mg/dL    Total Protein 7.7 6.0 - 8.4 g/dL    Albumin 3.9 3.5 - 5.2 g/dL    Total Bilirubin 1.8 (H) 0.1 - 1.0 mg/dL    Alkaline Phosphatase 76 55 - 135 U/L    AST 17 10 - 40 U/L    ALT 22 10 - 44 U/L    eGFR >60 >60 mL/min/1.73 m^2    Anion Gap 11 8 - 16 mmol/L   TSH    Collection Time: 04/15/24  7:17 AM   Result Value Ref Range    TSH 0.458 0.400 - 4.000 uIU/mL   Ethanol    Collection Time: 04/15/24  7:17 AM   Result Value Ref Range    Alcohol, Serum <10 <10 mg/dL   Drug screen panel, emergency    Collection Time: 04/15/24  8:15 AM   Result Value Ref Range    Benzodiazepines Negative Negative    Methadone metabolites Negative Negative    Cocaine (Metab.) Negative Negative    Opiate Scrn, Ur Negative Negative    Barbiturate Screen, Ur Negative Negative    Amphetamine Screen, Ur Negative Negative    THC Negative Negative    Phencyclidine Negative Negative    Creatinine, Urine 196.8 23.0 - 375.0 mg/dL    Toxicology Information SEE COMMENT    Echo    Collection Time: 04/15/24 12:39 PM   Result Value Ref Range    LVOT stroke volume 84.68 cm3    LVIDd 5.55 3.5 - 6.0 cm    LV Systolic Volume 57.54 mL    LVIDs 3.68 2.1 - 4.0 cm    LV Diastolic Volume 150.82 mL    IVS 1.00 0.6 - 1.1 cm    LVOT diameter 2.01 cm    LVOT area 3.2 cm2    FS 34 28 - 44 %    Left Ventricle Relative Wall Thickness 0.35 cm    Posterior Wall 0.98 0.6 - 1.1 cm    LV mass 213.60 g    MV Peak E Dagoberto 0.77 m/s    TDI LATERAL 0.17 m/s    TDI SEPTAL 0.09 m/s    E/E'  ratio 5.92 m/s    MV Peak A Dagoberto 0.51 m/s    TR Max Dagoberto 2.13 m/s    E/A ratio 1.51     IVRT 89.44 msec    E wave deceleration time 232.42 msec    LV SEPTAL E/E' RATIO 8.56 m/s    LV LATERAL E/E' RATIO 4.53 m/s    PV Peak S Dagoberto 0.61 m/s    PV Peak D Dagoberto 0.59 m/s    Pulm vein S/D ratio 1.03     LVOT peak dagoberto 1.18 m/s    Left Ventricular Outflow Tract Mean Velocity 0.77 cm/s    Left Ventricular Outflow Tract Mean Gradient 2.80 mmHg    RVDD 3.73 cm    RV S' 14.57 cm/s    LA size 4.03 cm    Left Atrium Minor Axis 5.61 cm    Left Atrium Major Axis 5.56 cm    RA Major Axis 5.17 cm    AV mean gradient 4 mmHg    AV peak gradient 8 mmHg    Ao peak dagoberto 1.40 m/s    Ao VTI 32.20 cm    LVOT peak VTI 26.70 cm    AV valve area 2.63 cm²    AV Velocity Ratio 0.84     AV index (prosthetic) 0.83     JERSON by Velocity Ratio 2.67 cm²    MV mean gradient 1 mmHg    MV peak gradient 3 mmHg    MV stenosis pressure 1/2 time 67.40 ms    MV valve area p 1/2 method 3.26 cm2    MV valve area by continuity eq 3.07 cm2    MV VTI 27.6 cm    Triscuspid Valve Regurgitation Peak Gradient 18 mmHg    PV PEAK VELOCITY 1.20 m/s    PV peak gradient 6 mmHg    Sinus 3.30 cm    STJ 2.42 cm    Ascending aorta 2.84 cm    IVC diameter 1.25 cm    Mean e' 0.13 m/s    LA volume 74.61 cm3    LA WIDTH 3.9 cm    RA Width 4.0 cm    TV resting pulmonary artery pressure 21 mmHg    RV TB RVSP 5 mmHg    Est. RA pres 3 mmHg       Microbiology Results (last 7 days)       ** No results found for the last 168 hours. **             Imaging Results               MRI Brain Without Contrast (Final result)  Result time 04/15/24 11:03:09      Final result by Goran Samuels MD (04/15/24 11:03:09)                   Impression:      Acute left midbrain lacunar type infarct.    This report was flagged in Epic as abnormal.      Electronically signed by: Goran Samuels MD  Date:    04/15/2024  Time:    11:03               Narrative:    EXAMINATION:  MRI BRAIN WITHOUT CONTRAST    CLINICAL  HISTORY:  Dizziness, persistent/recurrent, cardiac or vascular cause suspected;Headache, new or worsening, neuro deficit (Age 19-49y); diplopia;    TECHNIQUE:  Multiplanar multisequence MR imaging of the brain was performed without intravenous contrast.    COMPARISON:  CT 04/15/2024    FINDINGS:  Intracranial Compartment:    Ventricles are normal in size for age without evidence of hydrocephalus.    Subcentimeter focus of diffusion restriction in midbrain just left of midline, thought to reflect an acute lacunar type infarct.  Subtle corresponding T2-FLAIR signal hyperintensity.  No mass effect or hemorrhage. No evidence of acute infarct elsewhere.  No remote major vascular distribution infarct.    No evidence of recent or remote hemorrhage.    No significant intracranial mass effect or midline shift.    No extra-axial blood or fluid collections.    Skull/Extracranial Contents (limited evaluation):    Bone marrow signal intensity is unremarkable.    Findings were relayed to the ordering provider (Polo) via the [employer] secure chat system at approximately 11:00.                                       CTA Head and Neck (xpd) (Final result)  Result time 04/15/24 08:54:23      Final result by Maxi Gonzalez MD (04/15/24 08:54:23)                   Impression:      No acute abnormality. No high-grade stenosis or major vessel occlusion. If the patient has an acute, focal neurological deficit, MRI of the brain may be indicated.      Electronically signed by: Maxi Gonzalez MD  Date:    04/15/2024  Time:    08:54               Narrative:    EXAMINATION:  CTA HEAD AND NECK (XPD)    CLINICAL HISTORY:  Diplopia;Neuro deficit, acute, stroke suspected;Stroke/TIA, determine embolic source;    TECHNIQUE:  Non contrast low dose axial images were obtained thought the head. CT angiogram was performed from the level of the anai to the top of the head following the IV administration of 100 mL of Omnipaque 350.   Sagittal and  coronal reconstructions and maximum intensity projection reconstructions were performed. Arterial stenosis percentages are based on NASCET measurement criteria.    COMPARISON:  None.    FINDINGS:  CT HEAD:    No evidence of acute territorial infarct, hemorrhage, mass effect, or midline shift.    Ventricles are normal in size and configuration.    No displaced calvarial fracture.    Minimal mucosal thickening in the paranasal sinuses.  No air-fluid levels.  Mastoid air cells are essentially clear.    CTA HEAD AND NECK:    Soft tissues: No acute abnormality.  Few prominent cervical chain lymph nodes measuring up to 1.1 cm in short axis on the left (series 5, image 292), potentially reactive.  Right cervical chain lymph nodes measure up to 1 cm in short axis (series 5, image 285).  Epiglottis is within normal limits.  Prevertebral soft tissues are unremarkable.  Mild degenerative changes in the cervical spine.    Aorta: Two vessel aortic arch, with common origin of the brachiocephalic and left common carotid arteries.  No significant calcific atherosclerosis.    Extracranial carotid circulation: No hemodynamically significant stenosis, aneurysmal dilatation, or dissection.    Extracranial vertebral circulation: No hemodynamically significant stenosis, aneurysmal dilatation, or dissection.    Intracranial Arteries: No focal high-grade stenosis, occlusion, or aneurysm.    Venous structures (limited evaluation): Smooth filling defect in the right transverse sinus has appearance most consistent with arachnoid granulation.  Dural venous sinuses otherwise unremarkable.                                        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    Imaging:   MRI obtained  CTA Head/Neck to assess vasculature w/o major path  ECHO with bubble to assess for apparent clots and paradoxical strokes  Does have a small ASD/PFO      Labs:  Lipid panel, TSH, and A1c ordered    Consults:    Occupational Therapy  Physical Therapy   Speech Therapy  Vascular Neurology     Antithrombotics and Statin:   Aspirin: 325 mg oral once, 81 mg thereafter  Plavix 300 mg oral once, then 75 mg thereafter   SC enox 40mg or Hep 5k for dvt ppx  Atorvastatin 80 mg oral daily     Nursing Orders:   B/P: Permissive Hypertension if ischemic stroke expected  BP < 220/120 if no tPA administered or   BP < 180/105 if tPA administered   Neuro checks- q 4 hours  Antiembolic stockings  Swallowing evaluation by Nursing  Stroke Education  Blood glucose target 100-130  Up with assistance as tolerated    PFO (patent foramen ovale)  Discovered on ECHO  Unclear if cause at this point, will defer to Neuro        VTE Risk Mitigation (From admission, onward)      None                       AdmissionCare    Guideline: Stroke (Ischemic) - INPT, Inpatient    Based on the indications selected for the patient, the bed status of Inpatient was determined to be MET    The following indications were selected as present at the time of evaluation of the patient:      - Acute ischemic stroke with neurologic findings that warrant inpatient care, as indicated by 1 or more of the following:    - Gait impairment    AdmissionCare documentation entered by: Kervin Boles    [facility] [employer], 27th edition, Copyright © 2023 [facility] [employer], [facility] All Rights Reserved.  [id number]    Gordon Adam MD  Department of Hospital Medicine  [facility] [place] - Emergency Dept

## 2024-04-15 NOTE — HPI
Primo Krishna is a 43 y.o. male who has no hx past medical history, presented to the ED with CC of diplopia times 12 hours. Complains of blurred vision and dizziness as well. CTA showed no high-grade stenosis or major vessel occlusion. MRI positive for stroke, acute left midbrain lacunar type infarct. Patient states that he has normal vision out of each eye individually with together notes blurred vision. Started at 8pm last night. Neurology has seen the patient. Patient given aspirin, statin, and plavix load. ECHO w bubble pending.

## 2024-04-16 VITALS
RESPIRATION RATE: 17 BRPM | SYSTOLIC BLOOD PRESSURE: 119 MMHG | HEIGHT: 71 IN | TEMPERATURE: 99 F | BODY MASS INDEX: 27.72 KG/M2 | HEART RATE: 54 BPM | WEIGHT: 198 LBS | OXYGEN SATURATION: 95 % | DIASTOLIC BLOOD PRESSURE: 70 MMHG

## 2024-04-16 PROBLEM — F17.200 TOBACCO USE DISORDER: Status: ACTIVE | Noted: 2024-04-16

## 2024-04-16 LAB
ESTIMATED AVG GLUCOSE: 120 MG/DL (ref 68–131)
HBA1C MFR BLD: 5.8 % (ref 4–5.6)

## 2024-04-16 PROCEDURE — 99233 SBSQ HOSP IP/OBS HIGH 50: CPT | Mod: ,,, | Performed by: STUDENT IN AN ORGANIZED HEALTH CARE EDUCATION/TRAINING PROGRAM

## 2024-04-16 PROCEDURE — 25000003 PHARM REV CODE 250: Performed by: STUDENT IN AN ORGANIZED HEALTH CARE EDUCATION/TRAINING PROGRAM

## 2024-04-16 PROCEDURE — 97161 PT EVAL LOW COMPLEX 20 MIN: CPT

## 2024-04-16 PROCEDURE — 97165 OT EVAL LOW COMPLEX 30 MIN: CPT

## 2024-04-16 PROCEDURE — 97112 NEUROMUSCULAR REEDUCATION: CPT

## 2024-04-16 RX ORDER — ATORVASTATIN CALCIUM 80 MG/1
80 TABLET, FILM COATED ORAL DAILY
Qty: 90 TABLET | Refills: 3 | Status: SHIPPED | OUTPATIENT
Start: 2024-04-17 | End: 2025-04-17

## 2024-04-16 RX ORDER — ASPIRIN 81 MG/1
81 TABLET ORAL DAILY
Qty: 90 TABLET | Refills: 3 | Status: SHIPPED | OUTPATIENT
Start: 2024-04-17 | End: 2025-04-17

## 2024-04-16 RX ORDER — CLOPIDOGREL BISULFATE 75 MG/1
75 TABLET ORAL DAILY
Qty: 20 TABLET | Refills: 0 | Status: SHIPPED | OUTPATIENT
Start: 2024-04-17 | End: 2024-05-07

## 2024-04-16 RX ADMIN — CLOPIDOGREL BISULFATE 75 MG: 75 TABLET ORAL at 08:04

## 2024-04-16 RX ADMIN — ASPIRIN 81 MG: 81 TABLET, COATED ORAL at 08:04

## 2024-04-16 RX ADMIN — ATORVASTATIN CALCIUM 80 MG: 40 TABLET, FILM COATED ORAL at 08:04

## 2024-04-16 NOTE — PLAN OF CARE
Case Management Assessment     PCP: None. Instructed patient to contact [facility]  Pharmacy: [facility] [address], [place] [address]. [phone]     Patient Arrived From: Home  Existing Help at Home: Independent    Barriers to Discharge: None    Discharge Plan:    A. Home with family   B. Home with family  CM used [employer] via ipad to communicate with patient.  Jessica #[id number] assisted.   04/16/24 1510   Discharge Assessment   Assessment Type Discharge Planning Assessment   Confirmed/corrected address, phone number and insurance Yes   Confirmed Demographics Correct on Facesheet   Source of Information patient   Does patient/caregiver understand observation status Yes   Communicated JOEL with patient/caregiver Yes   People in Home friend(s)   Facility Arrived From: Home   Do you expect to return to your current living situation? Yes   Do you have help at home or someone to help you manage your care at home? Yes   Who are your caregiver(s) and their phone number(s)? CAROLANN BEAULIEU (Brother)  [phone]   Prior to hospitilization cognitive status: Alert/Oriented   Current cognitive status: Alert/Oriented   Equipment Currently Used at Home none   Readmission within 30 days? No   Patient currently being followed by outpatient case management? No   Do you currently have service(s) that help you manage your care at home? No   Do you take prescription medications? No   Do you have prescription coverage? No   Do you have any problems affording any of your prescribed medications? TBD   Who is going to help you get home at discharge? Friend will drive patient home   How do you get to doctors appointments? car, drives self   Are you on dialysis? No   Do you take coumadin? No   Discharge Plan A Home   DME Needed Upon Discharge  none   Discharge Plan discussed with: Patient   Transition of Care Barriers None

## 2024-04-16 NOTE — NURSING
[facility]  Nurses Note -- 4 Eyes      4/16/2024       Skin assessed on: Q Shift      [x] No Pressure Injuries Present    []Prevention Measures Documented    [] Yes LDA  for Pressure Injury Previously documented     [] Yes New Pressure Injury Discovered   [] LDA for New Pressure Injury Added      Attending RN:  Sonia Boyd RN     Second RN:  Vale

## 2024-04-16 NOTE — PROGRESS NOTES
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 And Stroke neurology called in for the same.      History from patient / family and medical records review. Symptoms of headaches / vague dizziness and binocular diplopia > 24 hrs prior to ED arrival. Reported of alcoholism this weekend / and the following morning on Sunday - have been experiencing the above mentioned symptoms. No compressive neuropathy pattern of presentation. No associated clinical seizures. No other focal motor or sensory or cranial nerve deficits. No similar events in the past. No history of strokes, seizures or migraines.     MRI brain acute left midbrain  infarct   CTA head and neck negative for VJ   Exam: binocular diplopia on downward gaze predominance left > right     Overview/Hospital Course:  No notes on file      Interval history     NAEON. No concerns for worsening NIHSS. Improvement in diplopia on downward gaze.     Bubble study positive for small ASD/PFO   A1C pending / LDL 87.6     No past medical history on file.    No past surgical history on file.    Review of patient's allergies indicates:  No Known Allergies    Current Neurological Medications:     Current Facility-Administered Medications   Medication Dose Route Frequency Provider Last Rate Last Admin    aspirin EC tablet 81 mg  81 mg Oral Daily Gordon Adam MD   81 mg at 04/16/24 0804    atorvastatin tablet 80 mg  80 mg Oral Daily Gordon Adam MD   80 mg at 04/16/24 0803    clopidogreL tablet 75 mg  75 mg Oral Daily Gordon Adam MD   75 mg at 04/16/24 0801      Family History    None       Tobacco Use    Smoking status: Never    Smokeless tobacco: Never   Substance and Sexual Activity    Alcohol use: Yes    Drug use: Not on file    Sexual activity: Not on file     Review of Systems   Constitutional:  Negative for chills and fever.   Respiratory:  Negative for shortness of breath.    Neurological:  Negative for seizures, facial asymmetry, speech difficulty, weakness, numbness and headaches.   Psychiatric/Behavioral:  Negative for agitation and confusion.      Objective:     Vital Signs (Most Recent):  Temp: 98.5 °F (36.9 °C) (04/16/24 0756)  Pulse: 64 (04/16/24 1050)  Resp: 17 (04/16/24 0756)  BP: 118/74 (04/16/24 0756)  SpO2: 97 % (04/16/24 0756) Vital Signs (24h Range):  Temp:  [98 °F (36.7 °C)-98.5 °F (36.9 °C)] 98.5 °F (36.9 °C)  Pulse:  [52-80] 64  Resp:  [12-22] 17  SpO2:  [94 %-98 %] 97 %  BP: (117-150)/(65-84) 118/74     Weight: 89.8 kg (198 lb)  Body mass index is 27.62 kg/m².     Physical Exam  HENT:      Head: Normocephalic and atraumatic.   Eyes:      Extraocular Movements: Extraocular movements intact.      Pupils: Pupils are equal, round, and reactive to light.   Pulmonary:      Effort: No respiratory distress.   Neurological:      Mental Status: He is alert and oriented to person, place, and time.   Psychiatric:         Speech: Speech normal.          NEUROLOGICAL EXAMINATION:     MENTAL STATUS   Oriented to person, place, and time.   Attention: normal.   Speech: speech is normal   Level of consciousness: alert  Normal comprehension.     CRANIAL NERVES     CN III, IV, VI   Pupils are equal, round, and reactive to light.  Nystagmus: none   Diplopia: bilateral, vertical, right and left  Ophthalmoparesis: none    CN VII   Facial expression full, symmetric.     MOTOR EXAM        No new focal motor deficits       SENSORY EXAM        No new focal sensory deficits       GAIT AND COORDINATION     Tremor   Resting tremor: absent      Significant Labs: Hemoglobin  "A1c: No results for input(s): "HGBA1C" in the last 720 hours.  Blood Culture: No results for input(s): "LABBLOO" in the last 48 hours.  BMP:   Recent Labs   Lab 04/15/24  0717         K 4.1      CO2 22*   BUN 14   CREATININE 1.1   CALCIUM 9.4     CBC:   Recent Labs   Lab 04/15/24  0717   WBC 7.42   HGB 14.3   HCT 46.0        CMP:   Recent Labs   Lab 04/15/24  0717         K 4.1      CO2 22*   BUN 14   CREATININE 1.1   CALCIUM 9.4   PROT 7.7   ALBUMIN 3.9   BILITOT 1.8*   ALKPHOS 76   AST 17   ALT 22   ANIONGAP 11     CSF Culture: No results for input(s): "CSFCULTURE" in the last 48 hours.  CSF Studies: No results for input(s): "ALIQUT", "APPEARCSF", "COLORCSF", "CSFWBC", "CSFRBC", "GLUCCSF", "LDHCSF", "PROTEINCSF", "VDRLCSF" in the last 48 hours.  Inflammatory Markers:   Recent Labs   Lab 04/15/24  1350   SEDRATE 1   CRP 4.8     POCT Glucose:   No results for input(s): "POCTGLUCOSE" in the last 24 hours.    Prealbumin: No results for input(s): "PREALBUMIN" in the last 48 hours.  Respiratory Culture: No results for input(s): "GSRESP", "RESPIRATORYC" in the last 48 hours.  Urine Culture: No results for input(s): "LABURIN" in the last 48 hours.  Urine Studies: No results for input(s): "COLORU", "APPEARANCEUA", "PHUR", "SPECGRAV", "PROTEINUA", "GLUCUA", "KETONESU", "BILIRUBINUA", "OCCULTUA", "NITRITE", "UROBILINOGEN", "LEUKOCYTESUR", "RBCUA", "WBCUA", "BACTERIA", "SQUAMEPITHEL", "HYALINECASTS" in the last 48 hours.    Invalid input(s): "WRIGHTSUR"  Recent Lab Results         04/15/24  1350   04/15/24  1239        Ascending aorta   2.84       Ao peak arnold   1.40       Ao VTI   32.20       AV valve area   2.63       JERSON by Velocity Ratio   2.67       AV mean gradient   4       AV index (prosthetic)   0.83       AV peak gradient   8       AV Velocity Ratio   0.84       CRP 4.8         Left Ventricle Relative Wall Thickness   0.35       E/A ratio   1.51       E/E' ratio   5.92    "    E wave deceleration time   232.42       Ferritin 90         FS   34       Iron 107         IVC diameter   1.25       IVRT   89.44       IVSd   1.00       LA WIDTH   3.9       Left Atrium Major Axis   5.56       Left Atrium Minor Axis   5.61       LA size   4.03       LA volume   74.61       LVOT area   3.2       LV LATERAL E/E' RATIO   4.53       LV SEPTAL E/E' RATIO   8.56       LV EDV BP   150.82       LVIDd   5.55       LVIDs   3.68       LV mass   213.60       Left Ventricular Outflow Tract Mean Gradient   2.80       Left Ventricular Outflow Tract Mean Velocity   0.77       LVOT diameter   2.01       LVOT peak dagoberto   1.18       LVOT stroke volume   84.68       LVOT peak VTI   26.70       LV ESV BP   57.54       Mean e'   0.13       MV valve area p 1/2 method   3.26       MV valve area by continuity eq   3.07       MV mean gradient   1       MV peak gradient   3       MV Peak A Dagoberto   0.51       MV Peak E Dagoberto   0.77       MV stenosis pressure 1/2 time   67.40       MV VTI   27.6       PV peak gradient   6       PV Peak D Dagoberto   0.59       PV Peak S Dagoberto   0.61       PV PEAK VELOCITY   1.20       Pulm vein S/D ratio   1.03       Posterior Wall   0.98       RA Major Axis   5.17       Est. RA pres   3       RA Width   4.0       RV S'   14.57       RV TB RVSP   5       RVDD   3.73       Saturated Iron 29         Sed Rate 1         Sinus   3.30       STJ   2.42       TDI SEPTAL   0.09       TDI LATERAL   0.17       TIBC 363         Transferrin 245         Triscuspid Valve Regurgitation Peak Gradient   18       TR Max Dagoberto   2.13       TV resting pulmonary artery pressure   21             All pertinent lab results from the past 24 hours have been reviewed.    Significant Imaging: I have reviewed and interpreted all pertinent imaging results/findings within the past 24 hours.  Assessment and Plan:     * Thrombotic stroke involving left posterior cerebral artery  43-year-old with no significant medical history with  admission concerns of focal neurological deficits. And Stroke neurology called in for the same.      History from patient / family and medical records review. Symptoms of headaches / vague dizziness and binocular diplopia > 24 hrs prior to ED arrival. Reported of alcoholism this weekend / and the following morning on Sunday - have been experiencing the above mentioned symptoms. No compressive neuropathy pattern of presentation. No associated clinical seizures. No other focal motor or sensory or cranial nerve deficits. No similar events in the past. No history of strokes, seizures or migraines.     MRI brain acute left midbrain  infarct   CTA head and neck negative for VJ   Exam: binocular diplopia on downward gaze predominance left > right     4/16: NAEON. No concerns for worsening NIHSS. Improvement in diplopia on downward gaze.   Bubble study positive for small ASD/PFO / A1C pending / LDL 87.6     Recs:  Thrombotic stroke involving the left PCA / midbrain infarct. No TNK.    Suspect small vessel etiology (history of smoking +) / rather cardio embolic or intrinsic coagulopathy     -  / Plavix 300 mg load - followed by DAPT X 21 day, ASA 81/Plavix 75, with asa thereafter  - target LDL < 70 / Continue atorvastatin  - euglycemic goals   - permissive HTN x 72 hrs post index event / long term < 140/90  - Cardiology F/u PFO on op basis + Holter monitor at Dc   - PT/OT recs - discharge planning   - F/u PCP for risk factor modification monitoring  -  on DASH diet; exercise and lifestyle modifications when appropriate     Stroke counseling provided during the visit - Identification of signs; emergency action and ER attention; Risk factor control, optimization for secondary stroke prevention; Compliance to medications     Headaches - IV Mag So4 2g IV q12hrs prn +/- prn tylenol   Avoid triptans   Hypercoagulable work up prn on op basis     F/u vascular neurology            VTE Risk Mitigation (From  admission, onward)      None            Toni Eaton MD  Neurology  [facility] [place] - Med Surg

## 2024-04-16 NOTE — NURSING
Pt arrived to the floor via bed to room 413.  Pt able to transfer to bed and ambulate to the restroom independently.    IV flushed and saline locked.  No distress noted, pt states his eyes are sensitive to light.  Bed in low position, wheels locked, call light in reach for assistance, will continue to monitor.    [facility]  Nurses Note -- 4 Eyes      4/15/2024       Skin assessed on: Admit      [x] No Pressure Injuries Present    []Prevention Measures Documented    [] Yes LDA  for Pressure Injury Previously documented     [] Yes New Pressure Injury Discovered   [] LDA for New Pressure Injury Added      Attending RN:  Vale Sylvester, ROBERTO     Second RN:  MARIANNA Ball

## 2024-04-16 NOTE — ASSESSMENT & PLAN NOTE
43-year-old with no significant medical history with admission concerns of focal neurological deficits. And Stroke neurology called in for the same.      History from patient / family and medical records review. Symptoms of headaches / vague dizziness and binocular diplopia > 24 hrs prior to ED arrival. Reported of alcoholism this weekend / and the following morning on Sunday - have been experiencing the above mentioned symptoms. No compressive neuropathy pattern of presentation. No associated clinical seizures. No other focal motor or sensory or cranial nerve deficits. No similar events in the past. No history of strokes, seizures or migraines.     MRI brain acute left midbrain  infarct   CTA head and neck negative for VJ   Exam: binocular diplopia on downward gaze predominance left > right     4/16: NAEON. No concerns for worsening NIHSS. Improvement in diplopia on downward gaze.   Bubble study positive for small ASD/PFO / A1C pending / LDL 87.6     Recs:  Thrombotic stroke involving the left PCA / midbrain infarct. No TNK.    Suspect small vessel etiology (history of smoking +) / rather cardio embolic or intrinsic coagulopathy     -  / Plavix 300 mg load - followed by DAPT X 21 day, ASA 81/Plavix 75, with asa thereafter  - target LDL < 70 / Continue atorvastatin  - euglycemic goals   - permissive HTN x 72 hrs post index event / long term < 140/90  - Cardiology F/u PFO on op basis + Holter monitor at Dc   - PT/OT recs - discharge planning   - F/u PCP for risk factor modification monitoring  -  on DASH diet; exercise and lifestyle modifications when appropriate     Stroke counseling provided during the visit - Identification of signs; emergency action and ER attention; Risk factor control, optimization for secondary stroke prevention; Compliance to medications     Headaches - IV Mag So4 2g IV q12hrs prn +/- prn tylenol   Avoid triptans   Hypercoagulable work up prn on op basis     F/u  vascular neurology

## 2024-04-16 NOTE — PT/OT/SLP EVAL
Occupational Therapy Evaluation and Treatment    Name: Primo Krishna  MRN: [id number]  Admitting Diagnosis: Thrombotic stroke involving left posterior cerebral artery  Recent Surgery: * No surgery found *      Recommendations:     Discharge Recommendations: Low Intensity Therapy  Level of Assistance Recommended:  None   Discharge Equipment Recommendations: none  Barriers to discharge: None    Assessment:     Primo Krishna is a 43 y.o. male with a medical diagnosis of Thrombotic stroke involving left posterior cerebral artery. He presents with performance deficits affecting function including visual deficits, other (comment) (he had diplopia and bluriness yesterday, but resolving today). Patient sitting in room with no lights on due to having some reported difficulty with light to dark transition taking extra time to adjust to bright lights initially. He is not having any more dizziness symptoms where he is describing the room as spinning like he did yesterday, but overall, says he feels weak.           Rehab Prognosis: Good; patient would benefit from acute OT services to address these deficits and reach maximum level of function.      Plan:     Patient to be seen   to address the above listed problems via self-care/home management, therapeutic activities, therapeutic exercises, neuromuscular re-education  Plan of Care Expires: 04/17/24  Plan of Care Reviewed with: patient, significant other    Subjective     Chief Complaint: dizziness and weakness   Patient Comments/Goals: return home   Pain/Comfort:  Pain Rating 1: 0/10    Patients cultural, spiritual, Jehovah's witness conflicts given the current situation: no    Social History:  Living Environment: Patient  lives with roommates   in a 2 story home with number of outside stair(s): 5  and number of inside stair(s): 15-18  Prior Level of Function: Prior to admission, patient was independent.  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  Equipment Used at Home: none  DME owned (not currently used): none  Assistance Upon Discharge: significant other    Objective:     Communicated with rn prior to session. Patient found sitting edge of bed with   upon OT entry to room.    General Precautions: Standard, fall   Orthopedic Precautions: N/A   Braces: N/A    Respiratory Status: Room air    Cognitive and Psychosocial Function:  Oriented to: Person, Place, Time, Situation  Follows Commands/Attention: attentive and follows multi-step commands  Communication: clear/fluent  Memory: No Deficits noted  Safety Awareness/Insight to Disability: intact  Mood/Affect/Coping Skills/Emotional Control: Appropriate to situation    Hearing: Intact    Vision: blurry vision      Physical Exam:  Postural examination/scapula alignment:    -       No postural abnormalities identified  Skin integrity: Visible skin intact  Hand dominance: Right        Left UE Right UE   UE Edema None noted None noted   UE ROM AROM WFL AROM WFL   UE Strength 5/5 5/5    Strength grasp WNL grasp WNL   Sensation LUE  INTACT:  WFL RUE  INTACT:  WFL   Fine Motor Coordination:  LUE  INTACT:  WFL RUE  INTACT:    WFL   Gross Motor Coordination: LUE  INTACT:  WFL RUE  INTACT:  WFL     Occupational Performance    Gait belt applied - No    Bed Mobility:   Independent    Functional Mobility/Transfers  Static Sitting EOB: independence  Dynamic Sitting EOB: independence  Static Standing Balance: independence  Dynamic Standing Balance: independence  Sit <> Stand Transfer with independence with no AD  Toilet Transfer Step Transfer technique with independence with no AD  Functionality Mobility: Patient walked with no AD and independence with no loss of balance or gait deviation to left or right     Activities of Daily Living:  Upper Body Dressing: independence  Lower Body Dressing: independence  Toileting: independence    AMPAC 6 Click ADL:  AMPAC Total Score: 24    Treatment &  Education:  Patient educated on role of OT, POC, and goals for therapy.  Patient educated on importance of OOB activities with staff member assistance and sitting OOB majority of the day.  Occupational therapy did complete visual screen with no nygstagmus, VOR1 or 2 reflexes seen, no diplopia, no inattention, full neck/head AROM, no blurriness, no visual field deficits    Patient clear to ambulate in hallways with RN/PCT.    Patient left sitting edge of bed with all lines intact, call button in reach, RN notified, and significant other present.    GOALS:   Multidisciplinary Problems       Occupational Therapy Goals          Problem: Occupational Therapy    Goal Priority Disciplines Outcome Interventions   Occupational Therapy Goal     OT, PT/OT Ongoing, Progressing    Description: Goals to be met by: 04/17/24     Patient will increase functional independence with ADLs by performing:    Bathing from  shower chair/bench with Modified Cubero.  Sitting in chair x60  minutes with Cubero  Demonstrate HEP for visual scanning in environment with independence.                         History:     No past medical history on file.    No past surgical history on file.    Time Tracking:     OT Date of Treatment: 04/16/24  OT Start Time: 1024  OT Stop Time: 1100  OT Total Time (min): 36 min    Billable Minutes: Evaluation 15  and Neuromuscular Re-education 21     4/16/2024

## 2024-04-16 NOTE — HOSPITAL COURSE
Mr Primo Krishna is a 43 y.o. man who presented with diplopia and was diagnosed with acute left midbrain infarct. Risk factors= HLD and tobacco use. CTA no acute changes. No events on telemetry. A1c pending but fasting glucose normal. TTE shows small PFO/ASD. Started asa, plavix x21 days, statin. Encouraged tobacco cessation. Neurology consulted. Deficits improving. Discussed diagnosis with patient and significant other using  Alena Kurtz #[id number]. Stable for discharge to home. Encouraged him to establish health insurance for follow up.

## 2024-04-16 NOTE — DISCHARGE SUMMARY
[facility]  Discharge Summary      Patient Name: Primo Krishna  MRN: [id number]  MCKINLEY: [id number]  Patient Class: IP- Inpatient  Admission Date: 4/15/2024  Hospital Length of Stay: 1 days  Discharge Date and Time:  04/16/2024 2:14 PM  Attending Physician: Heather Mitchell MD   Discharging Provider: Heather Mitchell MD  Primary Care Provider: Chrystal, Primary Doctor    Primary Care Team: Networked reference to record PCT     HPI:     Primo Krishna is a 43 y.o. male who has no hx past medical history, presented to the ED with CC of diplopia times 12 hours. Complains of blurred vision and dizziness as well. CTA showed no high-grade stenosis or major vessel occlusion. MRI positive for stroke, acute left midbrain lacunar type infarct. Patient states that he has normal vision out of each eye individually with together notes blurred vision. Started at 8pm last night. Neurology has seen the patient. Patient given aspirin, statin, and plavix load. ECHO w bubble pending.     * No surgery found *      Hospital Course:   Mr Primo Krishna is a 43 y.o. man who presented with diplopia and was diagnosed with acute left midbrain infarct. Risk factors= HLD and tobacco use. CTA no acute changes. No events on telemetry. A1c pending but fasting glucose normal. TTE shows small PFO/ASD. Started asa, plavix x21 days, statin. Encouraged tobacco cessation. Neurology consulted. Deficits improving. Discussed diagnosis with patient and significant other using  Alena Kurtz #[id number]. Stable for discharge to home. Encouraged him to establish health insurance for follow up.      Goals of Care Treatment Preferences:         Consults:   Consults (From admission, onward)          Status Ordering Provider     Inpatient consult to neurology  Once        Provider:  Toni Eaton MD    Completed TAMMIE DON            No new Assessment & Plan notes have been filed under this  hospital service since the last note was generated.  Service: Hospital Medicine    Final Active Diagnoses:    Diagnosis Date Noted POA    PRINCIPAL PROBLEM:  Thrombotic stroke involving left posterior cerebral artery [I63.332] 04/15/2024 Yes    Tobacco use disorder [F17.200] 04/16/2024 Yes    PFO (patent foramen ovale) [Q21.12] 04/15/2024 Not Applicable      Problems Resolved During this Admission:       Discharged Condition: stable    Disposition: Home or Self Care    Follow Up: with PCP and Neurology     Patient Instructions:      Diet Cardiac     Notify your health care provider if you experience any of the following:  temperature >100.4     Notify your health care provider if you experience any of the following:  persistent nausea and vomiting or diarrhea     Notify your health care provider if you experience any of the following:  severe uncontrolled pain     Notify your health care provider if you experience any of the following:  redness, tenderness, or signs of infection (pain, swelling, redness, odor or green/yellow discharge around incision site)     Notify your health care provider if you experience any of the following:  difficulty breathing or increased cough     Notify your health care provider if you experience any of the following:  severe persistent headache     Notify your health care provider if you experience any of the following:  worsening rash     Notify your health care provider if you experience any of the following:  persistent dizziness, light-headedness, or visual disturbances     Notify your health care provider if you experience any of the following:  increased confusion or weakness     Activity as tolerated       Significant Diagnostic Studies: N/A    Pending Diagnostic Studies:       Procedure Component Value Units Date/Time    BRENDA [[id number]] Collected: 04/15/24 1350    Order Status: Sent Lab Status: In process Updated: 04/16/24 1244    Specimen: Blood             Medications:  Reconciled Home Medications:      Medication List        START taking these medications      aspirin 81 MG EC tablet  Commonly known as: ECOTRIN  McIntosh 1 tableta (81 mg en total) por vía oral leslie vez al día.  (Take 1 tablet (81 mg total) by mouth once daily.)  Start taking on: April 17, 2024     atorvastatin 80 MG tablet  Commonly known as: LIPITOR  McIntosh leslie tableta (80 mg en total) por vía oral diariamente.  (Take 1 tablet (80 mg total) by mouth once daily.)  Start taking on: April 17, 2024     clopidogreL 75 mg tablet  Commonly known as: PLAVIX  Take 1 tablet (75 mg total) by mouth once daily. for 20 days  Start taking on: April 17, 2024              Indwelling Lines/Drains at time of discharge:   Lines/Drains/Airways       None                   Time spent on the discharge of patient: 35 minutes         Heather Mitchell MD  Department of Hospital Medicine  [facility]

## 2024-04-16 NOTE — PLAN OF CARE
Case Management Final Discharge Note      Discharge Disposition: Home    New DME ordered / company name: None    Relevant SDOH / Transition of Care Barriers:  None    Primary Caretaker and contact information: Independent    Scheduled followup appointment: Instructed patient to follow up with [facility] for an appointment.    Referrals placed: Instructed patient to follow up with [facility] for Neurology referral.  Offered to send referral to [facility] as well, but patient declined.    Transportation: Private vehicle/friend taking family home        Patient and family educated on discharge services and updated on DC plan. Bedside RN notified, patient clear to discharge from Case Management Perspective.     04/16/24 1505   Final Note   Assessment Type Final Discharge Note   Anticipated Discharge Disposition Home   Hospital Resources/Appts/Education Provided Provided patient/caregiver with written discharge plan information;Appointments scheduled and added to AVS   Post-Acute Status   Discharge Delays None known at this time

## 2024-04-16 NOTE — PLAN OF CARE
Problem: Adult Inpatient Plan of Care  Goal: Plan of Care Review  Outcome: Ongoing, Progressing  Flowsheets (Taken 4/16/2024 0815)  Plan of Care Reviewed With:   patient   significant other  Goal: Patient-Specific Goal (Individualized)  Outcome: Ongoing, Progressing     Problem: Adult Inpatient Plan of Care  Goal: Plan of Care Review  Outcome: Ongoing, Progressing  Flowsheets (Taken 4/16/2024 0815)  Plan of Care Reviewed With:   patient   significant other     Problem: Adult Inpatient Plan of Care  Goal: Plan of Care Review  Outcome: Ongoing, Progressing  Flowsheets (Taken 4/16/2024 0815)  Plan of Care Reviewed With:   patient   significant other     Problem: Adult Inpatient Plan of Care  Goal: Patient-Specific Goal (Individualized)  Outcome: Ongoing, Progressing     Problem: Adult Inpatient Plan of Care  Goal: Patient-Specific Goal (Individualized)  Outcome: Ongoing, Progressing

## 2024-04-16 NOTE — PLAN OF CARE
Patient currently has no insurance.    Email sent to [email or web address], for patient to be screened for Medicaid.

## 2024-04-16 NOTE — PLAN OF CARE
Problem: Occupational Therapy  Goal: Occupational Therapy Goal  Description: Goals to be met by: 04/17/24     Patient will increase functional independence with ADLs by performing:    Bathing from  shower chair/bench with Modified [place].  Sitting in chair x60  minutes with [place]  Demonstrate HEP for visual scanning in environment with independence.    Outcome: Ongoing, Progressing   Patient recommended for community based low intensive therapy due to recent visual disturbances that could affect depth perception and driving. Otherwise, patient has good UB strength and no basic ADL deficits. No DME recommended.

## 2024-04-16 NOTE — PT/OT/SLP PROGRESS
Speech Language Pathology      Primo Krishna  MRN: [id number]    Per report patient's swallow and language are at baseline. Politely refusing ST services. Noted discharge orders. No further ST is warranted at this time. Cynthia Milan, MEHUL-SLP

## 2024-04-16 NOTE — PT/OT/SLP EVAL
Physical Therapy Evaluation     Patient Name: Primo Krishna   MRN: [id number]  Recent Surgery: * No surgery found *      Recommendations:     Discharge Recommendations: No Therapy Indicated   Discharge Equipment Recommendations: none       Assessment:     Primo Krishna is a 43 y.o. 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 He presents with the following impairments/functional limitations:  (none observed).     Rehab Prognosis: Good; patient demonstrates safety with transfers and gait, no further skilled PT needed at this time.    Plan:     During this hospitalization, patient to be seen 1 x/week to address the above listed problems via initial evaluation.    Plan of Care Expires: 04/16/24    Subjective     Chief Complaint: Sensitivity to the light  Patient Comments/Goals: Pt agreeable to PT evaluation.  Pain/Comfort:  Pain Rating 1: 0/10    Social History:  Living Environment: Patient  lives with several male roomates  in a 2 story home with number of outside stair(s): 0  Prior Level of Function: Prior to admission, patient was independent; works as an .  Equipment Used at Home: none  DME owned (not currently used): none  Assistance Upon Discharge: significant other and roommate(s)    Objective:     Communicated with Sonia james prior to session. Patient found supine with  (no attachments) upon PT entry to room.    General Precautions: Standard,     Orthopedic Precautions: N/A   Braces: N/A    Respiratory Status: Room air    Exams:  Cognitive Exam: NT  RLE ROM: WFL  RLE Strength: WFL  LLE ROM: WFL  LLE Strength: WFL  Sensation: NT  Coordination: No deficits noted during functional mobility tasks  Tone:  normal    Functional Mobility:  Gait belt applied - No  Bed Mobility  Supine to Sit: independence for LE management and trunk management  Transfers  Sit to Stand: supervision with no AD  Gait  Patient ambulated 100' with no AD and  supervision.   Balance:  Static Sitting: independence   Good: Patient able to maintain balance without handhold support, limited postural sway.  Dynamic Sitting: NT     Static Standing: independence and supervision with no AD  Good: Patient able to maintain balance without handhold support, limited postural sway  Dynamic Standing: supervision with no AD  Good: Patient accepts moderate challenge; able to maintain balance while picking object off floor.  Stairs: Pt ascended/descended 1 flight(s) with No Assistive Device with left handrail and right handrail with Modified Independent.     Therapeutic Activities and Exercises:  Patient educated on role of acute care PT and PT POC.  Pt encouraged to sit in chair; lunch to follow shortly.    AM-PAC 6 CLICK MOBILITY  Turning over in bed (including adjusting bedclothes, sheets and blankets)?: 4  Sitting down on and standing up from a chair with arms (e.g., wheelchair, bedside commode, etc.): 4  Moving from lying on back to sitting on the side of the bed?: 4  Moving to and from a bed to a chair (including a wheelchair)?: 4  Need to walk in hospital room?: 4  Climbing 3-5 steps with a railing?: 4  Basic Mobility Total Score: 24     Patient left up in chair with all lines intact and girlfriend present.    GOALS:   Multidisciplinary Problems       Physical Therapy Goals          Problem: Physical Therapy    Goal Priority Disciplines Outcome Goal Variances Interventions   Physical Therapy Goal     PT, PT/OT Adequate for Care Transition                         History:   No past medical history on file.    No past surgical history on file.    Time Tracking:     PT Received On: 04/16/24  PT Start Time: 1153  PT Stop Time: 1201  PT Total Time (min): 8 min     Billable Minutes: Evaluation 8    4/16/2024

## 2024-04-16 NOTE — SUBJECTIVE & OBJECTIVE
Interval history     NAEON. No concerns for worsening NIHSS. Improvement in diplopia on downward gaze.     Bubble study positive for small ASD/PFO   A1C pending / LDL 87.6     No past medical history on file.    No past surgical history on file.    Review of patient's allergies indicates:  No Known Allergies    Current Neurological Medications:     Current Facility-Administered Medications   Medication Dose Route Frequency Provider Last Rate Last Admin    aspirin EC tablet 81 mg  81 mg Oral Daily Gordon Adam MD   81 mg at 04/16/24 0804    atorvastatin tablet 80 mg  80 mg Oral Daily Gordon Adam MD   80 mg at 04/16/24 0803    clopidogreL tablet 75 mg  75 mg Oral Daily Gordon Adam MD   75 mg at 04/16/24 0801     Family History    None       Tobacco Use    Smoking status: Never    Smokeless tobacco: Never   Substance and Sexual Activity    Alcohol use: Yes    Drug use: Not on file    Sexual activity: Not on file     Review of Systems   Constitutional:  Negative for chills and fever.   Respiratory:  Negative for shortness of breath.    Neurological:  Negative for seizures, facial asymmetry, speech difficulty, weakness, numbness and headaches.   Psychiatric/Behavioral:  Negative for agitation and confusion.      Objective:     Vital Signs (Most Recent):  Temp: 98.5 °F (36.9 °C) (04/16/24 0756)  Pulse: 64 (04/16/24 1050)  Resp: 17 (04/16/24 0756)  BP: 118/74 (04/16/24 0756)  SpO2: 97 % (04/16/24 0756) Vital Signs (24h Range):  Temp:  [98 °F (36.7 °C)-98.5 °F (36.9 °C)] 98.5 °F (36.9 °C)  Pulse:  [52-80] 64  Resp:  [12-22] 17  SpO2:  [94 %-98 %] 97 %  BP: (117-150)/(65-84) 118/74     Weight: 89.8 kg (198 lb)  Body mass index is 27.62 kg/m².     Physical Exam  HENT:      Head: Normocephalic and atraumatic.   Eyes:      Extraocular Movements: Extraocular movements intact.      Pupils: Pupils are equal, round, and reactive to light.   Pulmonary:      Effort: No respiratory distress.   Neurological:      Mental Status: He  "is alert and oriented to person, place, and time.   Psychiatric:         Speech: Speech normal.          NEUROLOGICAL EXAMINATION:     MENTAL STATUS   Oriented to person, place, and time.   Attention: normal.   Speech: speech is normal   Level of consciousness: alert  Normal comprehension.     CRANIAL NERVES     CN III, IV, VI   Pupils are equal, round, and reactive to light.  Nystagmus: none   Diplopia: bilateral, vertical, right and left  Ophthalmoparesis: none    CN VII   Facial expression full, symmetric.     MOTOR EXAM        No new focal motor deficits       SENSORY EXAM        No new focal sensory deficits       GAIT AND COORDINATION     Tremor   Resting tremor: absent      Significant Labs: Hemoglobin A1c: No results for input(s): "HGBA1C" in the last 720 hours.  Blood Culture: No results for input(s): "LABBLOO" in the last 48 hours.  BMP:   Recent Labs   Lab 04/15/24  0717         K 4.1      CO2 22*   BUN 14   CREATININE 1.1   CALCIUM 9.4     CBC:   Recent Labs   Lab 04/15/24  0717   WBC 7.42   HGB 14.3   HCT 46.0        CMP:   Recent Labs   Lab 04/15/24  0717         K 4.1      CO2 22*   BUN 14   CREATININE 1.1   CALCIUM 9.4   PROT 7.7   ALBUMIN 3.9   BILITOT 1.8*   ALKPHOS 76   AST 17   ALT 22   ANIONGAP 11     CSF Culture: No results for input(s): "CSFCULTURE" in the last 48 hours.  CSF Studies: No results for input(s): "ALIQUT", "APPEARCSF", "COLORCSF", "CSFWBC", "CSFRBC", "GLUCCSF", "LDHCSF", "PROTEINCSF", "VDRLCSF" in the last 48 hours.  Inflammatory Markers:   Recent Labs   Lab 04/15/24  1350   SEDRATE 1   CRP 4.8     POCT Glucose:   No results for input(s): "POCTGLUCOSE" in the last 24 hours.    Prealbumin: No results for input(s): "PREALBUMIN" in the last 48 hours.  Respiratory Culture: No results for input(s): "GSRESP", "RESPIRATORYC" in the last 48 hours.  Urine Culture: No results for input(s): "LABURIN" in the last 48 hours.  Urine Studies: No " "results for input(s): "COLORU", "APPEARANCEUA", "PHUR", "SPECGRAV", "PROTEINUA", "GLUCUA", "KETONESU", "BILIRUBINUA", "OCCULTUA", "NITRITE", "UROBILINOGEN", "LEUKOCYTESUR", "RBCUA", "WBCUA", "BACTERIA", "SQUAMEPITHEL", "HYALINECASTS" in the last 48 hours.    Invalid input(s): "WRIGHTSUR"  Recent Lab Results         04/15/24  1350   04/15/24  1239        Ascending aorta   2.84       Ao peak dagoberto   1.40       Ao VTI   32.20       AV valve area   2.63       JERSON by Velocity Ratio   2.67       AV mean gradient   4       AV index (prosthetic)   0.83       AV peak gradient   8       AV Velocity Ratio   0.84       CRP 4.8         Left Ventricle Relative Wall Thickness   0.35       E/A ratio   1.51       E/E' ratio   5.92       E wave deceleration time   232.42       Ferritin 90         FS   34       Iron 107         IVC diameter   1.25       IVRT   89.44       IVSd   1.00       LA WIDTH   3.9       Left Atrium Major Axis   5.56       Left Atrium Minor Axis   5.61       LA size   4.03       LA volume   74.61       LVOT area   3.2       LV LATERAL E/E' RATIO   4.53       LV SEPTAL E/E' RATIO   8.56       LV EDV BP   150.82       LVIDd   5.55       LVIDs   3.68       LV mass   213.60       Left Ventricular Outflow Tract Mean Gradient   2.80       Left Ventricular Outflow Tract Mean Velocity   0.77       LVOT diameter   2.01       LVOT peak dagoberto   1.18       LVOT stroke volume   84.68       LVOT peak VTI   26.70       LV ESV BP   57.54       Mean e'   0.13       MV valve area p 1/2 method   3.26       MV valve area by continuity eq   3.07       MV mean gradient   1       MV peak gradient   3       MV Peak A Dagoberto   0.51       MV Peak E Dagoberto   0.77       MV stenosis pressure 1/2 time   67.40       MV VTI   27.6       PV peak gradient   6       PV Peak D Dagoberto   0.59       PV Peak S Dagoberto   0.61       PV PEAK VELOCITY   1.20       Pulm vein S/D ratio   1.03       Posterior Wall   0.98       RA Major Axis   5.17       Est. RA pres   3    "    RA Width   4.0       RV S'   14.57       RV TB RVSP   5       RVDD   3.73       Saturated Iron 29         Sed Rate 1         Sinus   3.30       STJ   2.42       TDI SEPTAL   0.09       TDI LATERAL   0.17       TIBC 363         Transferrin 245         Triscuspid Valve Regurgitation Peak Gradient   18       TR Max Dagoberto   2.13       TV resting pulmonary artery pressure   21             All pertinent lab results from the past 24 hours have been reviewed.    Significant Imaging: I have reviewed and interpreted all pertinent imaging results/findings within the past 24 hours.

## 2024-04-17 LAB
ANA PATTERN 1: NORMAL
ANA SER QL IF: POSITIVE
ANA TITR SER IF: NORMAL {TITER}

## 2024-04-19 LAB
ANTI SM ANTIBODY: 0.13 RATIO (ref 0–0.99)
ANTI SM/RNP ANTIBODY: 0.1 RATIO (ref 0–0.99)
ANTI-SM INTERPRETATION: NEGATIVE
ANTI-SM/RNP INTERPRETATION: NEGATIVE
ANTI-SSA ANTIBODY: 0.11 RATIO (ref 0–0.99)
ANTI-SSA INTERPRETATION: NEGATIVE
ANTI-SSB ANTIBODY: 0.09 RATIO (ref 0–0.99)
ANTI-SSB INTERPRETATION: NEGATIVE
DSDNA AB SER-ACNC: NORMAL [IU]/ML